# Patient Record
Sex: FEMALE | Race: WHITE | NOT HISPANIC OR LATINO | Employment: STUDENT | URBAN - METROPOLITAN AREA
[De-identification: names, ages, dates, MRNs, and addresses within clinical notes are randomized per-mention and may not be internally consistent; named-entity substitution may affect disease eponyms.]

---
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
## 2019-07-08 ENCOUNTER — OFFICE VISIT (OUTPATIENT)
Dept: NEUROLOGY | Facility: CLINIC | Age: 19
End: 2019-07-08
Payer: COMMERCIAL

## 2019-07-08 VITALS
HEART RATE: 73 BPM | HEIGHT: 67 IN | BODY MASS INDEX: 20.56 KG/M2 | WEIGHT: 131 LBS | SYSTOLIC BLOOD PRESSURE: 116 MMHG | DIASTOLIC BLOOD PRESSURE: 69 MMHG

## 2019-07-08 DIAGNOSIS — R20.2 PARESTHESIA: ICD-10-CM

## 2019-07-08 DIAGNOSIS — R20.0 NUMBNESS IN BOTH HANDS: ICD-10-CM

## 2019-07-08 DIAGNOSIS — I73.00 RAYNAUD'S DISEASE WITHOUT GANGRENE: Primary | ICD-10-CM

## 2019-07-08 PROCEDURE — 99205 OFFICE O/P NEW HI 60 MIN: CPT | Performed by: PSYCHIATRY & NEUROLOGY

## 2019-07-08 RX ORDER — DROSPIRENONE AND ETHINYL ESTRADIOL 0.02-3(28)
KIT ORAL
COMMUNITY
Start: 2019-05-31 | End: 2020-05-21 | Stop reason: SDUPTHER

## 2019-07-08 RX ORDER — LORAZEPAM 0.5 MG/1
TABLET ORAL
Qty: 2 TABLET | Refills: 0 | Status: SHIPPED | OUTPATIENT
Start: 2019-07-08 | End: 2019-07-17 | Stop reason: SDUPTHER

## 2019-07-08 NOTE — PROGRESS NOTES
Review of Systems   Constitutional: Positive for fatigue  HENT: Positive for tinnitus  Eyes: Positive for pain and visual disturbance  Respiratory: Positive for shortness of breath  Cardiovascular: Positive for chest pain and palpitations  Gastrointestinal: Positive for nausea  Endocrine: Negative  Genitourinary: Positive for urgency  Musculoskeletal: Positive for arthralgias, back pain, myalgias and neck pain  Skin: Negative  Allergic/Immunologic: Negative  Neurological: Positive for dizziness, syncope, light-headedness, numbness and headaches  Face numbness, tingling   Hematological: Negative  Psychiatric/Behavioral: Positive for confusion and sleep disturbance  The patient is nervous/anxious           Depression, mood swings

## 2019-07-08 NOTE — PROGRESS NOTES
[facility]LE [facility]  PATIENT:  Tim Wright  MRN:  [id number]  :  2000  DATE OF SERVICE:  2019    Assessment/Plan:     Problem List Items Addressed This Visit        Cardiovascular and Mediastinum    Raynaud's disease without gangrene - Primary       Other    Paresthesia    Relevant Medications    LORazepam (ATIVAN) 0 5 mg tablet    Numbness in both hands    Relevant Orders    MRI brain MS wo and w contrast    MRI cervical spine with and without contrast    CBC and differential    Comprehensive metabolic panel    TSH, 3rd generation with Free T4 reflex    JASON Screen w/ Reflex to Titer/Pattern    Sedimentation rate, automated    Vitamin B12         Ms Herbert Rowe has presented to [address] [place] [address] for evaluation of diffuse paresthesia involving upper and lower extremities  Lower extremities sensory dysfunction was evaluated in 2018 and she was noted sacralization in with L5-S1 foraminal stenosis and facet degeneration  We agreed that in circumstances of strong family history of autoimmune dysfunction, patient would require basic blood work and MRI brain and cervical spine to rule out CNS demyelination  Based on findings, patient would benefit from LP if clinically indicated  Patient also reported uncontrolled anxiety and panic attacks, follow up with psychiatrist highly advised at this point  Patient may reach her primary care team for further evaluation  Follow up with Najma will be scheduled based on imaging studies  We discussed headache frequency and intensity might be increased due to her estrogen component of BCP  Subjective: multifocal sensory dysfunction, panic attacks  HPI History of Present Illness     Mrs Herbert oRwe is a 19 yo who was referred to [address] [place] [address] for evaluation of tingling  Patient does not have established care neither [facility] nor [facility]  No records from recent provider- no referral in [facility]; Patient had PT for right hip pain in 2017  In 2015 patient had evaluation with blood work for celiac disease, TSH, HbA1C, ferritin and testosterone, CMV and EBV  Tingling in her legs, and now once side of the body, or at time different  sides  Patient had soccer related injury to lower back and hip, but concern for spine with mild facet degeneration at L5-S1  Patient stated she was difficulties walking, with no weakness, but numbness in arm bilaterally  Left calve heat sensation, with some disesthesia has been reported  Patient described electric zap in lower back  Grandmother has a a form of parkinsonism and lymphoma in grandfather,migraines and cystic lesion in brain in other family members  Lightheadedness, with blurriness and near-syncope was reported as well, and she was told her findings are due to worsening anemia       The following portions of the patient's history were reviewed and updated as appropriate:   She  has no past medical history on file  She   Patient Active Problem List    Diagnosis Date Noted    Raynaud's disease without gangrene 07/08/2019    Paresthesia 07/08/2019    Numbness in both hands 07/08/2019     She  has no past surgical history on file  Her family history includes Asthma in her father; Breast cancer in her mother; Migraines in her maternal grandmother; Prostate cancer in her father  She  reports that she has never smoked  She has never used smokeless tobacco  She reports that she does not drink alcohol or use drugs  Current Outpatient Medications   Medication Sig Dispense Refill    drospirenone-ethinyl estradiol (RAQUEL) 3-0 02 MG per tablet       LORazepam (ATIVAN) 0 5 mg tablet Take 1 tab 40 min prior to imaging with a second dose 10 min prior to mri  2 tablet 0     No current facility-administered medications for this visit        Current Outpatient Medications on File Prior to Visit   Medication Sig    drospirenone-ethinyl estradiol (RAQUEL) 3-0 02 MG per tablet      No current facility-administered medications on file prior to visit  She has No Known Allergies            Objective:    Blood pressure 116/69, pulse 73, height 5' 7" (1 702 m), weight 59 4 kg (131 lb)  Physical Exam/Neurological Exam    CONSTITUTIONAL: NAD, pleasant  NECK: supple, no lymphadenopathy, no thyromegaly, no JVD  CARDIOVASCULAR: RRR, normal S1S2, no murmurs, no rubs  RESP: clear to auscultation bilaterally, no wheezes/rhonchi/rales  ABDOMEN: soft, non tender, non distended  SKIN: no rash or skin lesions  EXTREMITIES: no edema, pulses 2+bilaterally  PSYCH: appropriate mood and affect  NEUROLOGIC COMPREHENSIVE EXAM: Patient is oriented to person, place and time, NAD; appropriate affect  CN II, III, IV, V, VI, VII,VIII,IX,X,XI-XII intact with EOMI, PERRLA, OKN intact, VF grossly intact, fundi poorly visualized secondary to pupillary constriction; symmetric face noted  Motor: 5/5 UE/LE bilateral symmetric; Sensory: perception of diminished light touch and pinprick hands bilaterally; normal vibration sensation feet bilaterally; Coordination within normal limits on FTN and DEE DEE testing; DTR: 2+/4 through, no Babinski, no clonus  Tandem gait is intact  Romberg: negative      ROS:  12 points of review of system was reviewed with the patient and was unremarkable with exception: see HPI  Review of Systems

## 2019-07-16 LAB
ALBUMIN SERPL-MCNC: 4.3 G/DL (ref 3.6–5.1)
ALBUMIN/GLOB SERPL: 1.7 (CALC) (ref 1–2.5)
ALP SERPL-CCNC: 35 U/L (ref 33–115)
ALT SERPL-CCNC: 10 U/L (ref 6–29)
ANA SER QL IF: NEGATIVE
AST SERPL-CCNC: 18 U/L (ref 10–30)
BASOPHILS # BLD AUTO: 42 CELLS/UL (ref 0–200)
BASOPHILS NFR BLD AUTO: 0.8 %
BILIRUB SERPL-MCNC: 0.6 MG/DL (ref 0.2–1.2)
BUN SERPL-MCNC: 9 MG/DL (ref 7–25)
BUN/CREAT SERPL: NORMAL (CALC) (ref 6–22)
CALCIUM SERPL-MCNC: 9.6 MG/DL (ref 8.6–10.2)
CHLORIDE SERPL-SCNC: 103 MMOL/L (ref 98–110)
CO2 SERPL-SCNC: 25 MMOL/L (ref 20–32)
CREAT SERPL-MCNC: 0.89 MG/DL (ref 0.5–1.1)
EOSINOPHIL # BLD AUTO: 88 CELLS/UL (ref 15–500)
EOSINOPHIL NFR BLD AUTO: 1.7 %
ERYTHROCYTE [DISTWIDTH] IN BLOOD BY AUTOMATED COUNT: 12.8 % (ref 11–15)
ERYTHROCYTE [SEDIMENTATION RATE] IN BLOOD BY WESTERGREN METHOD: 6 MM/H
GLOBULIN SER CALC-MCNC: 2.5 G/DL (CALC) (ref 1.9–3.7)
GLUCOSE SERPL-MCNC: 82 MG/DL (ref 65–99)
HCT VFR BLD AUTO: 39.9 % (ref 35–45)
HGB BLD-MCNC: 13.2 G/DL (ref 11.7–15.5)
LYMPHOCYTES # BLD AUTO: 2272 CELLS/UL (ref 850–3900)
LYMPHOCYTES NFR BLD AUTO: 43.7 %
MCH RBC QN AUTO: 28 PG (ref 27–33)
MCHC RBC AUTO-ENTMCNC: 33.1 G/DL (ref 32–36)
MCV RBC AUTO: 84.7 FL (ref 80–100)
MONOCYTES # BLD AUTO: 411 CELLS/UL (ref 200–950)
MONOCYTES NFR BLD AUTO: 7.9 %
NEUTROPHILS # BLD AUTO: 2387 CELLS/UL (ref 1500–7800)
NEUTROPHILS NFR BLD AUTO: 45.9 %
PLATELET # BLD AUTO: 247 THOUSAND/UL (ref 140–400)
PMV BLD REES-ECKER: 10 FL (ref 7.5–12.5)
POTASSIUM SERPL-SCNC: 4.4 MMOL/L (ref 3.5–5.3)
PROT SERPL-MCNC: 6.8 G/DL (ref 6.1–8.1)
RBC # BLD AUTO: 4.71 MILLION/UL (ref 3.8–5.1)
SL AMB EGFR AFRICAN AMERICAN: 108 ML/MIN/1.73M2
SL AMB EGFR NON AFRICAN AMERICAN: 93 ML/MIN/1.73M2
SODIUM SERPL-SCNC: 136 MMOL/L (ref 135–146)
TSH SERPL-ACNC: 1.52 MIU/L
VIT B12 SERPL-MCNC: 430 PG/ML (ref 200–1100)
WBC # BLD AUTO: 5.2 THOUSAND/UL (ref 3.8–10.8)

## 2019-07-17 DIAGNOSIS — R20.2 PARESTHESIA: ICD-10-CM

## 2019-07-17 NOTE — TELEPHONE ENCOUNTER
Patient's mother Mary Garza states the patient's MRIs were scheduled on 2 separate days (7/23 and 7/25)  She is requesting additional ativan be sent to [facility] for the second MRI  Mary Garza is also questioning if contrast for the MRIs is needed as she heard this can cause adverse effects  I did explain that it is recommended to have MRI done with contrast as this is how Dr Martin Townsend ordered the imaging and the patient's kidney function was normal  She would like to check with a provider  Made Mary Garza aware that lab results were normal      Mary Garza is also asking about out of pocket cost for MRIs, specifically if she will be charged twice for radiologists reading the images  I did explain that even if the MRIs were on the same day, it is two separate tests  She is requesting a call back with information  Called and Left a message on [employer] machine for a call back  Our office does not have this information  She can check the price using  on [facility] website or call number listed on  which is [phone] ([phone])

## 2019-07-19 RX ORDER — LORAZEPAM 0.5 MG/1
TABLET ORAL
Qty: 2 TABLET | Refills: 0 | Status: SHIPPED | OUTPATIENT
Start: 2019-07-19 | End: 2020-05-21

## 2019-07-19 NOTE — TELEPHONE ENCOUNTER
Patient's mother Apurva Hernandez was also questioning if contrast was needed  She is concerned about her daughter having a reaction  I did explain that it is recommended to have MRI done with contrast as this is how Dr Christina Moncada ordered the imaging and the patient's kidney function was normal  She would like to check with a provider  Please advise

## 2019-07-19 NOTE — TELEPHONE ENCOUNTER
Pt's mom made aware  She still is asking if she would be charged for 2 reading fees  I made her aware that she could contact central scheduling and they maybe able to advise or direct her further    She states that she has a phone number from [employer] for a financial dept, she will contact them

## 2019-07-19 NOTE — TELEPHONE ENCOUNTER
Left message for sheila to return call to office to inform her of message below as well as prescription for premed of ativan to be sent to pharmacy

## 2019-07-23 ENCOUNTER — HOSPITAL ENCOUNTER (OUTPATIENT)
Dept: MRI IMAGING | Facility: HOSPITAL | Age: 19
Discharge: HOME | End: 2019-07-23
Attending: PSYCHIATRY & NEUROLOGY
Payer: COMMERCIAL

## 2019-07-23 DIAGNOSIS — R20.0 NUMBNESS IN BOTH HANDS: ICD-10-CM

## 2019-07-23 PROCEDURE — A9585 GADOBUTROL INJECTION: HCPCS | Performed by: RADIOLOGY

## 2019-07-23 PROCEDURE — 70553 MRI BRAIN STEM W/O & W/DYE: CPT

## 2019-07-23 RX ADMIN — GADOBUTROL 6 ML: 604.72 INJECTION INTRAVENOUS at 15:19

## 2019-07-25 ENCOUNTER — HOSPITAL ENCOUNTER (OUTPATIENT)
Dept: MRI IMAGING | Facility: HOSPITAL | Age: 19
Discharge: HOME | End: 2019-07-25
Attending: PSYCHIATRY & NEUROLOGY
Payer: COMMERCIAL

## 2019-07-25 DIAGNOSIS — R20.0 NUMBNESS IN BOTH HANDS: ICD-10-CM

## 2019-07-25 PROCEDURE — 72156 MRI NECK SPINE W/O & W/DYE: CPT

## 2019-07-25 PROCEDURE — A9585 GADOBUTROL INJECTION: HCPCS | Performed by: RADIOLOGY

## 2019-07-25 RX ADMIN — GADOBUTROL 6 ML: 604.72 INJECTION INTRAVENOUS at 14:52

## 2019-07-31 ENCOUNTER — TELEPHONE (OUTPATIENT)
Dept: NEUROLOGY | Facility: CLINIC | Age: 19
End: 2019-07-31

## 2019-07-31 PROBLEM — H61.23 BILATERAL IMPACTED CERUMEN: Status: ACTIVE | Noted: 2019-07-31

## 2019-07-31 NOTE — TELEPHONE ENCOUNTER
Imaging were personally reviewed - no concern for MS or other CNS demyelination - her work up at [address] is completed  No LP advised either  Consult Note review was consistent with findings of uncontrolled anxiety and panic disorder - patent was advised to follow with psychiatry  Otherwise, follow up with primary care team advised, consider no MS noted during her evaluation

## 2019-07-31 NOTE — TELEPHONE ENCOUNTER
pt's mom called stating that pt had mri's and labs done  they viewed these on my chart  I reviewed these with her   she is asking what is the next step, does pt need to be seen for follow up and an other recommendations in regards to N/T, what is causing the N/T? She states that she returns to school on 8/7  Still gets numbness to her legs and face, occurs daily, numbness lasts for a couple min, but tingling can last all day  She states that she gets zaps that goes up her back  Gets when she is walking long distances  Usually occurs daily but has not occurred for the last 4 days  also complains that her chest hurts, like someone is sitting on her chest   Not sure if related to anxiety  This is all the time, gets bad at night or if she walks up the stairs  She also states that when she is on the treadmill, she has chest pain and wheezes  She was previously given an inhaler for questionable exercise induced asthma  Feels like she has always had this but has been getting worse  I did advise that if pt is having chest pain that she should be evaluated in the ER  She is asking if someone could just see her  I made her aware that she should at least call pcp in regards to this    Please advise  [phone]- ext [place]-hal[employer] work  [phone]  Try work phone first

## 2019-08-02 NOTE — TELEPHONE ENCOUNTER
Pt's mother Yoanna Talley (on communication consent) called and advised of all of the below  She verbalized clear understanding of all instructions  Asking if there are other test you recommend bec pt is reporting headache frequency and intensity  Phylicia Newsome that per office notes 7/8/19-it was discussed that headache frequency and intensity might be increased due to her estrogen component of BCP  Yoanna Talley states that pt did talk to her OB and pt was advised to change her BCP to progesterone only but pt declined at this time  Yoanna Talley wants to know if pt should be concern of the tiny, right paramedian protrusion C6-C7? Any recommendation? Also, states that pt gets chest pain  Phylicia Newsome if pt experiencing chest pain, pt should go to the ER to get vannessaal  Yoanna Talley verbalized understanding

## 2019-08-02 NOTE — TELEPHONE ENCOUNTER
Preeti - Greatly agree with your advise and recommendations, no additional recommendations from neurology prospective, including minimal degenerative changes at her cervical region  Thank you!

## 2019-12-13 NOTE — PROGRESS NOTES
Cardiology Consultation     Hieu Valentin  [id number]  2000  [facility] [address] [address]      1  Atypical chest pain  POCT ECG    Stress test only, exercise   2  Palpitations  Echo complete with contrast if indicated    AMB extended holter monitor       Discussion/Summary:  Ms Marilou Marrero is a pleasant 39-year-old female who presents to the office today for the evaluation of chest pain and palpitations  Her chest pain is atypical in nature  It occurs at rest   It sounds more musculoskeletal in nature  Nonetheless due to her concern over her symptoms I have recommended that she undergo a exercise stress test   She also has palpitations with a sudden onset and offset  These occur a few times a week  I have asked that she undergo an echocardiogram to rule out any underlying structural heart disease  I have also asked that she undergo a one-week event monitor to look for symptom-rhythm correlation to rule out SVT as a cause for her symptoms  I will relay the above-noted testing to her over the phone and of course if any of these tests are abnormal she will return to the office for further evaluation  History of Present Illness:  Ms Marilou Marrero is a pleasant 39-year-old female who presents to the office today for the evaluation of multiple symptoms  She states over the past year she has been experiencing chest discomfort  She describes a very sharp, stabbing central chest discomfort which occurs randomly, mostly at night and at rest   At times she feels some chest discomfort with deep breathing but not consistently  She denies any positional component to her chest pain  She also states if she moves a certain way or twists her torso suddenly she will developed the discomfort  It is not related to food    It can last for a few minutes and resolves spontaneously without any specific intervention  In the summer she was active exercising at the gym on a regular basis  She was utilizing a treadmill and running on flat ground for about 20 minutes  With this she would experience some shortness of breath which is chronic  She would not experience any of the sharp stabbing chest discomfort but would note tightness which is a chronic issue which improves with the utilization of her inhaler for her exercise-induced asthma  She also has been experiencing palpitations  A few times a week she will experience which she describes as a sudden onset of a rapid heartbeat  She feels as if her heart stops and then again beats rapidly  This can occur intermittently for a few minutes  With this she denies any other symptoms  Otherwise she will note some lightheadedness if she stands too fast or walks up a staircase  This is a chronic issue  She denies any side wendi syncope  She attempts to remain well hydrated during the day  She reports drinking 32 oz of water on a regular basis  She denies excessive caffeine intake or use of over the counter supplements       Patient Active Problem List   Diagnosis    Raynaud's disease without gangrene    Paresthesia    Numbness in both hands    Bilateral impacted cerumen    Atypical chest pain    Palpitations     Past Medical History:   Diagnosis Date    Acne     Asthma      Social History     Socioeconomic History    Marital status: Single     Spouse name: Not on file    Number of children: Not on file    Years of education: Not on file    Highest education level: Not on file   Occupational History    Not on file   Social Needs    Financial resource strain: Not on file    Food insecurity:     Worry: Not on file     Inability: Not on file    Transportation needs:     Medical: Not on file     Non-medical: Not on file   Tobacco Use    Smoking status: Never Smoker    Smokeless tobacco: Never Used   Substance and Sexual Activity    Alcohol use: Never     Frequency: Never    Drug use: Never    Sexual activity: Yes   Lifestyle    Physical activity:     Days per week: Not on file     Minutes per session: Not on file    Stress: Not on file   Relationships    Social connections:     Talks on phone: Not on file     Gets together: Not on file     Attends Restorationism service: Not on file     Active member of club or organization: Not on file     Attends meetings of clubs or organizations: Not on file     Relationship status: Not on file    Intimate partner violence:     Fear of current or ex partner: Not on file     Emotionally abused: Not on file     Physically abused: Not on file     Forced sexual activity: Not on file   Other Topics Concern    Not on file   Social History Narrative    Not on file      Family History   Problem Relation Age of Onset    Breast cancer Mother     Migraines Maternal Grandmother     Prostate cancer Father     Asthma Father      History reviewed  No pertinent surgical history  Current Outpatient Medications:     drospirenone-ethinyl estradiol (RAQUEL) 3-0 02 MG per tablet, , Disp: , Rfl:     LORazepam (ATIVAN) 0 5 mg tablet, Take 1 tab 40 min prior to imaging with a second dose 10 min prior to mri , Disp: 2 tablet, Rfl: 0  Allergies   Allergen Reactions    Raquel [Drospirenone-Ethinyl Estradiol]          Labs:  No visits with results within 2 Month(s) from this visit     Latest known visit with results is:   Orders Only on 07/12/2019   Component Date Value    Glucose, Random 07/12/2019 82     BUN 07/12/2019 9     Creatinine 07/12/2019 0 89     eGFR Non African American 07/12/2019 93     eGFR  07/12/2019 108     SL AMB BUN/CREATININE RA* 81/42/8307 NOT APPLICABLE     Sodium 36/35/4758 136     Potassium 07/12/2019 4 4     Chloride 07/12/2019 103     CO2 07/12/2019 25     SL AMB CALCIUM 07/12/2019 9 6     Protein, Total 07/12/2019 6 8     Albumin 07/12/2019 4 3     Globulin 07/12/2019 2 5     Albumin/Globulin Ratio 07/12/2019 1 7     TOTAL BILIRUBIN 07/12/2019 0 6     Alkaline Phosphatase 07/12/2019 35     AST 07/12/2019 18     ALT 07/12/2019 10     Sedimentation Rate 07/12/2019 6     White Blood Cell Count 07/12/2019 5 2     Red Blood Cell Count 07/12/2019 4 71     Hemoglobin 07/12/2019 13 2     HCT 07/12/2019 39 9     MCV 07/12/2019 84 7     MCH 07/12/2019 28 0     MCHC 07/12/2019 33 1     RDW 07/12/2019 12 8     Platelet Count 74/05/2388 247     SL AMB MPV 07/12/2019 10 0     Neutrophils (Absolute) 07/12/2019 2,387     Lymphocytes (Absolute) 07/12/2019 2,272     Monocytes (Absolute) 07/12/2019 411     Eosinophils (Absolute) 07/12/2019 88     Basophils ABS 07/12/2019 42     Neutrophils 07/12/2019 45 9     Lymphocytes 07/12/2019 43 7     Monocytes 07/12/2019 7 9     Eosinophils 07/12/2019 1 7     Basophils PCT 07/12/2019 0 8     JASON Screen, IFA 07/12/2019 NEGATIVE     Vitamin B-12 07/12/2019 430     TSH W/RFX TO FREE T4 07/12/2019 1 52         Imaging: No results found  Review of Systems:  Review of Systems   Respiratory: Positive for chest tightness and shortness of breath  Cardiovascular: Positive for chest pain and palpitations  All other systems reviewed and are negative          Vitals:    12/16/19 0859   BP: 100/70   BP Location: Right arm   Patient Position: Sitting   Cuff Size: Standard   Pulse: 91   Weight: 59 kg (130 lb)   Height: 5' 7" (1 702 m)     Vitals:    12/16/19 0859   Weight: 59 kg (130 lb)     Height: 5' 7" (170 2 cm)     Physical Exam:  General appearance:  Appears stated age, alert, well appearing and in no distress  HEENT:  PERRLA, EOMI, no scleral icterus, no conjunctival pallor  NECK:  Supple, No elevated JVP, no thyromegaly, no carotid bruits  HEART:  Regular rate and rhythm, normal S1/S2, no S3/S4, no murmur or rub  LUNGS:  Clear to auscultation bilaterally  ABDOMEN:  Soft, non-tender, positive bowel sounds, no rebound or guarding, no organomegaly   EXTREMITIES:  No edema  VASCULAR:  Normal pedal pulses   SKIN: No lesions or rashes on exposed skin  NEURO:  CN II-XII intact, no focal deficits

## 2019-12-16 ENCOUNTER — OFFICE VISIT (OUTPATIENT)
Dept: CARDIOLOGY CLINIC | Facility: CLINIC | Age: 19
End: 2019-12-16
Payer: COMMERCIAL

## 2019-12-16 ENCOUNTER — TELEPHONE (OUTPATIENT)
Dept: CARDIOLOGY CLINIC | Facility: CLINIC | Age: 19
End: 2019-12-16

## 2019-12-16 VITALS
DIASTOLIC BLOOD PRESSURE: 70 MMHG | HEIGHT: 67 IN | HEART RATE: 91 BPM | SYSTOLIC BLOOD PRESSURE: 100 MMHG | BODY MASS INDEX: 20.4 KG/M2 | WEIGHT: 130 LBS

## 2019-12-16 DIAGNOSIS — R07.89 ATYPICAL CHEST PAIN: Primary | ICD-10-CM

## 2019-12-16 DIAGNOSIS — R00.2 PALPITATIONS: ICD-10-CM

## 2019-12-16 PROCEDURE — 93000 ELECTROCARDIOGRAM COMPLETE: CPT | Performed by: INTERNAL MEDICINE

## 2019-12-16 PROCEDURE — 99244 OFF/OP CNSLTJ NEW/EST MOD 40: CPT | Performed by: INTERNAL MEDICINE

## 2019-12-19 NOTE — TELEPHONE ENCOUNTER
She does not need auth for her Zio Patch per Kelby SABA at [facility]  12/18/19

## 2019-12-20 ENCOUNTER — HOSPITAL ENCOUNTER (OUTPATIENT)
Dept: NON INVASIVE DIAGNOSTICS | Facility: CLINIC | Age: 19
Discharge: HOME | End: 2019-12-20
Payer: COMMERCIAL

## 2019-12-20 DIAGNOSIS — R07.89 ATYPICAL CHEST PAIN: ICD-10-CM

## 2019-12-20 LAB
ARRHY DURING EX: NORMAL
CHEST PAIN STATEMENT: NORMAL
MAX DIASTOLIC BP: 78 MMHG
MAX HEART RATE: 179 BPM
MAX PREDICTED HEART RATE: 200 BPM
MAX. SYSTOLIC BP: 130 MMHG
PROTOCOL NAME: NORMAL
REASON FOR TERMINATION: NORMAL
TARGET HR FORMULA: NORMAL
TEST INDICATION: NORMAL
TIME IN EXERCISE PHASE: NORMAL

## 2019-12-20 PROCEDURE — 93018 CV STRESS TEST I&R ONLY: CPT | Performed by: INTERNAL MEDICINE

## 2019-12-20 PROCEDURE — 93017 CV STRESS TEST TRACING ONLY: CPT

## 2019-12-20 PROCEDURE — 93016 CV STRESS TEST SUPVJ ONLY: CPT | Performed by: INTERNAL MEDICINE

## 2019-12-26 ENCOUNTER — TRANSCRIBE ORDERS (OUTPATIENT)
Dept: ADMINISTRATIVE | Facility: HOSPITAL | Age: 19
End: 2019-12-26

## 2019-12-26 ENCOUNTER — HOSPITAL ENCOUNTER (OUTPATIENT)
Dept: NON INVASIVE DIAGNOSTICS | Facility: HOSPITAL | Age: 19
Discharge: HOME | End: 2019-12-26
Payer: COMMERCIAL

## 2019-12-26 DIAGNOSIS — R00.2 PALPITATIONS: ICD-10-CM

## 2019-12-26 PROCEDURE — 93306 TTE W/DOPPLER COMPLETE: CPT

## 2019-12-27 PROCEDURE — 93306 TTE W/DOPPLER COMPLETE: CPT | Performed by: INTERNAL MEDICINE

## 2020-01-24 ENCOUNTER — CLINICAL SUPPORT (OUTPATIENT)
Dept: CARDIOLOGY CLINIC | Facility: CLINIC | Age: 20
End: 2020-01-24
Payer: COMMERCIAL

## 2020-01-24 DIAGNOSIS — R00.2 PALPITATIONS: ICD-10-CM

## 2020-01-24 PROCEDURE — 0298T PR EXT ECG > 48HR TO 21 DAY REVIEW AND INTERPRETATN: CPT | Performed by: INTERNAL MEDICINE

## 2020-02-10 ENCOUNTER — TELEPHONE (OUTPATIENT)
Dept: CARDIOLOGY CLINIC | Facility: CLINIC | Age: 20
End: 2020-02-10

## 2020-02-10 NOTE — TELEPHONE ENCOUNTER
A message was left on an answering machine last week - symptoms did not correlate with an arrhythmia  Please relay again - thanks       Lourdes Del Castillo

## 2020-05-21 ENCOUNTER — TELEMEDICINE (OUTPATIENT)
Dept: FAMILY MEDICINE CLINIC | Facility: CLINIC | Age: 20
End: 2020-05-21
Payer: COMMERCIAL

## 2020-05-21 DIAGNOSIS — L70.0 ACNE VULGARIS: Primary | ICD-10-CM

## 2020-05-21 DIAGNOSIS — J45.990 EXERCISE-INDUCED ASTHMA: ICD-10-CM

## 2020-05-21 DIAGNOSIS — N92.6 IRREGULAR MENSES: ICD-10-CM

## 2020-05-21 DIAGNOSIS — R07.89 ATYPICAL CHEST PAIN: ICD-10-CM

## 2020-05-21 DIAGNOSIS — G43.109 MIGRAINE WITH AURA AND WITHOUT STATUS MIGRAINOSUS, NOT INTRACTABLE: ICD-10-CM

## 2020-05-21 DIAGNOSIS — I73.00 RAYNAUD'S DISEASE WITHOUT GANGRENE: ICD-10-CM

## 2020-05-21 DIAGNOSIS — R00.2 PALPITATIONS: ICD-10-CM

## 2020-05-21 PROBLEM — L70.9 ACNE: Status: ACTIVE | Noted: 2017-03-24

## 2020-05-21 PROBLEM — H61.23 BILATERAL IMPACTED CERUMEN: Status: RESOLVED | Noted: 2019-07-31 | Resolved: 2020-05-21

## 2020-05-21 PROCEDURE — 99203 OFFICE O/P NEW LOW 30 MIN: CPT | Performed by: NURSE PRACTITIONER

## 2020-05-21 RX ORDER — TERBINAFINE HYDROCHLORIDE 250 MG/1
TABLET ORAL
COMMUNITY
Start: 2020-02-24 | End: 2020-05-21

## 2020-05-21 RX ORDER — DROSPIRENONE AND ETHINYL ESTRADIOL 0.02-3(28)
1 KIT ORAL DAILY
Qty: 28 TABLET | Refills: 0 | Status: SHIPPED | OUTPATIENT
Start: 2020-05-21 | End: 2020-05-21 | Stop reason: SDUPTHER

## 2020-05-21 RX ORDER — DROSPIRENONE AND ETHINYL ESTRADIOL 0.02-3(28)
1 KIT ORAL DAILY
Qty: 84 TABLET | Refills: 0 | Status: SHIPPED | OUTPATIENT
Start: 2020-05-21 | End: 2020-08-04 | Stop reason: SDUPTHER

## 2020-08-04 DIAGNOSIS — N92.6 IRREGULAR MENSES: ICD-10-CM

## 2020-08-04 DIAGNOSIS — L70.0 ACNE VULGARIS: ICD-10-CM

## 2020-08-04 RX ORDER — DROSPIRENONE AND ETHINYL ESTRADIOL 0.02-3(28)
1 KIT ORAL DAILY
Qty: 84 TABLET | Refills: 1 | Status: SHIPPED | OUTPATIENT
Start: 2020-08-04 | End: 2020-11-24 | Stop reason: SDUPTHER

## 2020-10-12 ENCOUNTER — OFFICE VISIT (OUTPATIENT)
Dept: FAMILY MEDICINE CLINIC | Facility: CLINIC | Age: 20
End: 2020-10-12
Payer: COMMERCIAL

## 2020-10-12 ENCOUNTER — TRANSCRIBE ORDERS (OUTPATIENT)
Dept: ADMINISTRATIVE | Age: 20
End: 2020-10-12

## 2020-10-12 ENCOUNTER — APPOINTMENT (OUTPATIENT)
Dept: RADIOLOGY | Age: 20
End: 2020-10-12
Payer: COMMERCIAL

## 2020-10-12 VITALS — HEART RATE: 88 BPM | OXYGEN SATURATION: 99 %

## 2020-10-12 DIAGNOSIS — Z20.828 EXPOSURE TO SARS-ASSOCIATED CORONAVIRUS: Primary | ICD-10-CM

## 2020-10-12 DIAGNOSIS — R07.1 CHEST PAIN ON BREATHING: ICD-10-CM

## 2020-10-12 DIAGNOSIS — M25.579 ARTHRALGIA OF FOOT, UNSPECIFIED LATERALITY: ICD-10-CM

## 2020-10-12 DIAGNOSIS — Z20.828 EXPOSURE TO MONONUCLEOSIS SYNDROME: ICD-10-CM

## 2020-10-12 DIAGNOSIS — G43.109 MIGRAINE WITH AURA AND WITHOUT STATUS MIGRAINOSUS, NOT INTRACTABLE: ICD-10-CM

## 2020-10-12 DIAGNOSIS — R06.02 SHORTNESS OF BREATH: ICD-10-CM

## 2020-10-12 PROCEDURE — 99214 OFFICE O/P EST MOD 30 MIN: CPT | Performed by: NURSE PRACTITIONER

## 2020-10-12 PROCEDURE — 71046 X-RAY EXAM CHEST 2 VIEWS: CPT

## 2020-10-12 PROCEDURE — 1036F TOBACCO NON-USER: CPT | Performed by: NURSE PRACTITIONER

## 2020-10-14 LAB
25(OH)D3 SERPL-MCNC: 39 NG/ML (ref 30–100)
ALBUMIN SERPL-MCNC: 4.4 G/DL (ref 3.6–5.1)
ALBUMIN/GLOB SERPL: 1.8 (CALC) (ref 1–2.5)
ALP SERPL-CCNC: 35 U/L (ref 31–125)
ALT SERPL-CCNC: 8 U/L (ref 6–29)
ANA SER QL IF: NEGATIVE
AST SERPL-CCNC: 12 U/L (ref 10–30)
BASOPHILS # BLD AUTO: 19 CELLS/UL (ref 0–200)
BASOPHILS NFR BLD AUTO: 0.3 %
BILIRUB SERPL-MCNC: 0.4 MG/DL (ref 0.2–1.2)
BUN SERPL-MCNC: 12 MG/DL (ref 7–25)
BUN/CREAT SERPL: NORMAL (CALC) (ref 6–22)
CALCIUM SERPL-MCNC: 9.9 MG/DL (ref 8.6–10.2)
CHLORIDE SERPL-SCNC: 102 MMOL/L (ref 98–110)
CO2 SERPL-SCNC: 27 MMOL/L (ref 20–32)
CREAT SERPL-MCNC: 0.81 MG/DL (ref 0.5–1.1)
D DIMER PPP FEU-MCNC: 0.37 MCG/ML FEU
EBV NA IGG SER IA-ACNC: 338 U/ML
EBV VCA IGG SER IA-ACNC: 178 U/ML
EBV VCA IGM SER IA-ACNC: <36 U/ML
EOSINOPHIL # BLD AUTO: 50 CELLS/UL (ref 15–500)
EOSINOPHIL NFR BLD AUTO: 0.8 %
ERYTHROCYTE [DISTWIDTH] IN BLOOD BY AUTOMATED COUNT: 12.6 % (ref 11–15)
GLOBULIN SER CALC-MCNC: 2.5 G/DL (CALC) (ref 1.9–3.7)
GLUCOSE SERPL-MCNC: 88 MG/DL (ref 65–139)
HCT VFR BLD AUTO: 38.6 % (ref 35–45)
HGB BLD-MCNC: 12.9 G/DL (ref 11.7–15.5)
LYMPHOCYTES # BLD AUTO: 1890 CELLS/UL (ref 850–3900)
LYMPHOCYTES NFR BLD AUTO: 30 %
MCH RBC QN AUTO: 28.7 PG (ref 27–33)
MCHC RBC AUTO-ENTMCNC: 33.4 G/DL (ref 32–36)
MCV RBC AUTO: 85.8 FL (ref 80–100)
MONOCYTES # BLD AUTO: 378 CELLS/UL (ref 200–950)
MONOCYTES NFR BLD AUTO: 6 %
NEUTROPHILS # BLD AUTO: 3963 CELLS/UL (ref 1500–7800)
NEUTROPHILS NFR BLD AUTO: 62.9 %
PLATELET # BLD AUTO: 255 THOUSAND/UL (ref 140–400)
PMV BLD REES-ECKER: 10 FL (ref 7.5–12.5)
POTASSIUM SERPL-SCNC: 4.4 MMOL/L (ref 3.5–5.3)
PROT SERPL-MCNC: 6.9 G/DL (ref 6.1–8.1)
RBC # BLD AUTO: 4.5 MILLION/UL (ref 3.8–5.1)
SARS-COV-2 IGG SERPL QL IA: POSITIVE
SL AMB EGFR AFRICAN AMERICAN: 120 ML/MIN/1.73M2
SL AMB EGFR NON AFRICAN AMERICAN: 104 ML/MIN/1.73M2
SODIUM SERPL-SCNC: 137 MMOL/L (ref 135–146)
TSH SERPL-ACNC: 2.68 MIU/L
URATE SERPL-MCNC: 6 MG/DL (ref 2.5–7)
WBC # BLD AUTO: 6.3 THOUSAND/UL (ref 3.8–10.8)

## 2020-11-02 ENCOUNTER — TELEPHONE (OUTPATIENT)
Dept: FAMILY MEDICINE CLINIC | Facility: CLINIC | Age: 20
End: 2020-11-02

## 2020-11-24 ENCOUNTER — TELEMEDICINE (OUTPATIENT)
Dept: FAMILY MEDICINE CLINIC | Facility: CLINIC | Age: 20
End: 2020-11-24
Payer: COMMERCIAL

## 2020-11-24 DIAGNOSIS — N92.6 IRREGULAR MENSES: Primary | ICD-10-CM

## 2020-11-24 DIAGNOSIS — L70.0 ACNE VULGARIS: ICD-10-CM

## 2020-11-24 DIAGNOSIS — G43.109 MIGRAINE WITH AURA AND WITHOUT STATUS MIGRAINOSUS, NOT INTRACTABLE: ICD-10-CM

## 2020-11-24 DIAGNOSIS — B37.3 VULVOVAGINAL CANDIDIASIS: ICD-10-CM

## 2020-11-24 DIAGNOSIS — R07.89 ATYPICAL CHEST PAIN: ICD-10-CM

## 2020-11-24 DIAGNOSIS — J45.990 EXERCISE-INDUCED ASTHMA: ICD-10-CM

## 2020-11-24 PROCEDURE — 99213 OFFICE O/P EST LOW 20 MIN: CPT | Performed by: NURSE PRACTITIONER

## 2020-11-24 RX ORDER — TRETINOIN 0.05 G/100G
GEL TOPICAL
COMMUNITY
Start: 2020-11-05

## 2020-11-24 RX ORDER — CHLORHEXIDINE GLUCONATE 0.12 MG/ML
RINSE ORAL
COMMUNITY
Start: 2020-10-09 | End: 2022-07-26

## 2020-11-24 RX ORDER — DROSPIRENONE AND ETHINYL ESTRADIOL 0.02-3(28)
1 KIT ORAL DAILY
Qty: 84 TABLET | Refills: 1 | Status: SHIPPED | OUTPATIENT
Start: 2020-11-24 | End: 2021-06-02

## 2020-12-22 ENCOUNTER — ANNUAL EXAM (OUTPATIENT)
Dept: FAMILY MEDICINE CLINIC | Facility: CLINIC | Age: 20
End: 2020-12-22
Payer: COMMERCIAL

## 2020-12-22 VITALS
HEIGHT: 68 IN | DIASTOLIC BLOOD PRESSURE: 68 MMHG | SYSTOLIC BLOOD PRESSURE: 108 MMHG | BODY MASS INDEX: 20.13 KG/M2 | HEART RATE: 106 BPM | RESPIRATION RATE: 16 BRPM | TEMPERATURE: 97.3 F | WEIGHT: 132.8 LBS | OXYGEN SATURATION: 98 %

## 2020-12-22 DIAGNOSIS — R82.90 BAD ODOR OF URINE: ICD-10-CM

## 2020-12-22 DIAGNOSIS — Z11.3 SCREENING EXAMINATION FOR STD (SEXUALLY TRANSMITTED DISEASE): ICD-10-CM

## 2020-12-22 DIAGNOSIS — Z12.4 CERVICAL CANCER SCREENING: Primary | ICD-10-CM

## 2020-12-22 DIAGNOSIS — F41.9 ANXIETY: ICD-10-CM

## 2020-12-22 DIAGNOSIS — N63.20 LEFT BREAST LUMP: ICD-10-CM

## 2020-12-22 LAB
SL AMB  POCT GLUCOSE, UA: ABNORMAL
SL AMB LEUKOCYTE ESTERASE,UA: POSITIVE
SL AMB POCT BILIRUBIN,UA: ABNORMAL
SL AMB POCT BLOOD,UA: ABNORMAL
SL AMB POCT CLARITY,UA: ABNORMAL
SL AMB POCT COLOR,UA: YELLOW
SL AMB POCT KETONES,UA: POSITIVE
SL AMB POCT NITRITE,UA: ABNORMAL
SL AMB POCT PH,UA: 6.5
SL AMB POCT SPECIFIC GRAVITY,UA: 1.01
SL AMB POCT URINE PROTEIN: POSITIVE
SL AMB POCT UROBILINOGEN: 0.2

## 2020-12-22 PROCEDURE — 81003 URINALYSIS AUTO W/O SCOPE: CPT | Performed by: NURSE PRACTITIONER

## 2020-12-22 PROCEDURE — G0145 SCR C/V CYTO,THINLAYER,RESCR: HCPCS | Performed by: NURSE PRACTITIONER

## 2020-12-22 PROCEDURE — 87491 CHLMYD TRACH DNA AMP PROBE: CPT | Performed by: NURSE PRACTITIONER

## 2020-12-22 PROCEDURE — 87591 N.GONORRHOEAE DNA AMP PROB: CPT | Performed by: NURSE PRACTITIONER

## 2020-12-22 PROCEDURE — 1036F TOBACCO NON-USER: CPT | Performed by: NURSE PRACTITIONER

## 2020-12-22 PROCEDURE — 99214 OFFICE O/P EST MOD 30 MIN: CPT | Performed by: NURSE PRACTITIONER

## 2020-12-22 PROCEDURE — 87086 URINE CULTURE/COLONY COUNT: CPT | Performed by: NURSE PRACTITIONER

## 2020-12-22 PROCEDURE — 3008F BODY MASS INDEX DOCD: CPT | Performed by: NURSE PRACTITIONER

## 2020-12-22 PROCEDURE — 87070 CULTURE OTHR SPECIMN AEROBIC: CPT | Performed by: NURSE PRACTITIONER

## 2020-12-22 PROCEDURE — S0612 ANNUAL GYNECOLOGICAL EXAMINA: HCPCS | Performed by: NURSE PRACTITIONER

## 2020-12-22 RX ORDER — DOXYCYCLINE HYCLATE 20 MG
TABLET ORAL
COMMUNITY
Start: 2020-12-02 | End: 2022-07-26

## 2020-12-22 RX ORDER — MELOXICAM 15 MG/1
TABLET ORAL
COMMUNITY
Start: 2020-12-01 | End: 2022-07-26

## 2020-12-22 RX ORDER — HYDROXYZINE HYDROCHLORIDE 25 MG/1
25 TABLET, FILM COATED ORAL 2 TIMES DAILY PRN
Qty: 30 TABLET | Refills: 0 | Status: SHIPPED | OUTPATIENT
Start: 2020-12-22 | End: 2022-07-26

## 2020-12-23 LAB
BACTERIA UR CULT: NORMAL
C TRACH DNA SPEC QL NAA+PROBE: NEGATIVE
N GONORRHOEA DNA SPEC QL NAA+PROBE: NEGATIVE

## 2020-12-25 LAB — BACTERIA GENITAL AEROBE CULT: NORMAL

## 2020-12-28 LAB
LAB AP GYN PRIMARY INTERPRETATION: NORMAL
LAB AP LMP: NORMAL
Lab: NORMAL

## 2020-12-29 ENCOUNTER — HOSPITAL ENCOUNTER (OUTPATIENT)
Dept: ULTRASOUND IMAGING | Facility: CLINIC | Age: 20
Discharge: HOME | End: 2020-12-29
Payer: COMMERCIAL

## 2020-12-29 VITALS — WEIGHT: 130 LBS | HEIGHT: 67 IN | BODY MASS INDEX: 20.4 KG/M2

## 2020-12-29 DIAGNOSIS — N63.20 LEFT BREAST LUMP: ICD-10-CM

## 2020-12-29 PROCEDURE — 76642 ULTRASOUND BREAST LIMITED: CPT

## 2021-06-02 DIAGNOSIS — N92.6 IRREGULAR MENSES: ICD-10-CM

## 2021-06-02 DIAGNOSIS — L70.0 ACNE VULGARIS: ICD-10-CM

## 2021-06-02 RX ORDER — DROSPIRENONE AND ETHINYL ESTRADIOL 0.02-3(28)
KIT ORAL
Qty: 84 TABLET | Refills: 3 | Status: SHIPPED | OUTPATIENT
Start: 2021-06-02 | End: 2022-05-04

## 2021-12-15 ENCOUNTER — 1 YEAR COMPLETE EXAM (OUTPATIENT)
Dept: URBAN - METROPOLITAN AREA CLINIC 6 | Facility: CLINIC | Age: 21
End: 2021-12-15

## 2021-12-15 DIAGNOSIS — Z01.00: ICD-10-CM

## 2021-12-15 PROCEDURE — 92014 COMPRE OPH EXAM EST PT 1/>: CPT

## 2021-12-15 PROCEDURE — G8428 CUR MEDS NOT DOCUMENT: HCPCS

## 2021-12-15 PROCEDURE — 1036F TOBACCO NON-USER: CPT

## 2021-12-15 ASSESSMENT — TONOMETRY
OD_IOP_MMHG: 16
OS_IOP_MMHG: 14

## 2021-12-15 ASSESSMENT — VISUAL ACUITY
OS_SC: 20/20
OD_SC: 20/20

## 2022-07-13 ENCOUNTER — RA CDI HCC (OUTPATIENT)
Dept: OTHER | Facility: HOSPITAL | Age: 22
End: 2022-07-13

## 2022-07-13 NOTE — PROGRESS NOTES
[facility]  coding opportunities       Chart reviewed, no opportunity found: CHART REVIEWED, NO OPPORTUNITY FOUND        Patients Insurance        Commercial Insurance: [address] [place] [address]

## 2022-07-22 RX ORDER — TAZAROTENE 0.05 MG/G
CREAM CUTANEOUS
COMMUNITY
Start: 2022-04-15

## 2022-07-22 RX ORDER — SPIRONOLACTONE 50 MG/1
TABLET, FILM COATED ORAL
COMMUNITY
Start: 2022-06-29 | End: 2022-07-26

## 2022-07-26 ENCOUNTER — OFFICE VISIT (OUTPATIENT)
Dept: FAMILY MEDICINE CLINIC | Facility: CLINIC | Age: 22
End: 2022-07-26
Payer: COMMERCIAL

## 2022-07-26 VITALS
SYSTOLIC BLOOD PRESSURE: 112 MMHG | TEMPERATURE: 97 F | WEIGHT: 135 LBS | BODY MASS INDEX: 21.19 KG/M2 | HEART RATE: 80 BPM | DIASTOLIC BLOOD PRESSURE: 78 MMHG | RESPIRATION RATE: 16 BRPM | OXYGEN SATURATION: 99 % | HEIGHT: 67 IN

## 2022-07-26 DIAGNOSIS — R42 DIZZINESS: ICD-10-CM

## 2022-07-26 DIAGNOSIS — Z23 ENCOUNTER FOR IMMUNIZATION: ICD-10-CM

## 2022-07-26 DIAGNOSIS — G89.29 CHRONIC BILATERAL LOW BACK PAIN WITH BILATERAL SCIATICA: ICD-10-CM

## 2022-07-26 DIAGNOSIS — M54.41 CHRONIC BILATERAL LOW BACK PAIN WITH BILATERAL SCIATICA: ICD-10-CM

## 2022-07-26 DIAGNOSIS — Z11.59 NEED FOR HEPATITIS C SCREENING TEST: ICD-10-CM

## 2022-07-26 DIAGNOSIS — Z11.4 SCREENING FOR HIV (HUMAN IMMUNODEFICIENCY VIRUS): ICD-10-CM

## 2022-07-26 DIAGNOSIS — F41.9 ANXIETY: ICD-10-CM

## 2022-07-26 DIAGNOSIS — M54.42 CHRONIC BILATERAL LOW BACK PAIN WITH BILATERAL SCIATICA: ICD-10-CM

## 2022-07-26 DIAGNOSIS — J45.990 EXERCISE-INDUCED ASTHMA: ICD-10-CM

## 2022-07-26 DIAGNOSIS — Z00.00 ANNUAL PHYSICAL EXAM: Primary | ICD-10-CM

## 2022-07-26 DIAGNOSIS — F51.04 PSYCHOPHYSIOLOGICAL INSOMNIA: ICD-10-CM

## 2022-07-26 DIAGNOSIS — F51.4 SLEEP TERROR: ICD-10-CM

## 2022-07-26 DIAGNOSIS — M79.672 LEFT FOOT PAIN: ICD-10-CM

## 2022-07-26 PROBLEM — R20.0 NUMBNESS IN BOTH HANDS: Status: RESOLVED | Noted: 2019-07-08 | Resolved: 2022-07-26

## 2022-07-26 PROBLEM — G43.109 MIGRAINE WITH AURA AND WITHOUT STATUS MIGRAINOSUS, NOT INTRACTABLE: Status: RESOLVED | Noted: 2019-08-01 | Resolved: 2022-07-26

## 2022-07-26 PROCEDURE — 3725F SCREEN DEPRESSION PERFORMED: CPT | Performed by: NURSE PRACTITIONER

## 2022-07-26 PROCEDURE — 99395 PREV VISIT EST AGE 18-39: CPT | Performed by: NURSE PRACTITIONER

## 2022-07-26 RX ORDER — HYDROXYZINE HYDROCHLORIDE 25 MG/1
25 TABLET, FILM COATED ORAL 2 TIMES DAILY PRN
Qty: 30 TABLET | Refills: 0 | Status: CANCELLED | OUTPATIENT
Start: 2022-07-26

## 2022-07-26 RX ORDER — ESCITALOPRAM OXALATE 5 MG/1
5 TABLET ORAL DAILY
Qty: 30 TABLET | Refills: 5 | Status: SHIPPED | OUTPATIENT
Start: 2022-07-26

## 2022-07-26 RX ORDER — LORAZEPAM 0.5 MG/1
0.5 TABLET ORAL 2 TIMES DAILY PRN
Qty: 30 TABLET | Refills: 0 | Status: SHIPPED | OUTPATIENT
Start: 2022-07-26

## 2022-07-26 NOTE — PROGRESS NOTES
[facility] PRIMARY CARE    NAME: Sheila Hay  AGE: 20 y o  SEX: female  : 2000     DATE: 2022     Assessment and Plan:     Problem List Items Addressed This Visit        Respiratory    Exercise-induced asthma     Well controlled, rare use of inhaler  Nervous and Auditory    Chronic bilateral low back pain with bilateral sciatica     MRI under care everywhere 2018  Completed PT in the past      IMPRESSION:   Impression:     There is sacralization of the L5 vertebral body  Please see discussion above  Small broad-based protrusion and mild facet degeneration at L5-S1  No   significant neural foraminal or spinal canal stenosis  Referral to spine/pain  Other    Anxiety    Relevant Medications    escitalopram (LEXAPRO) 5 mg tablet    LORazepam (Ativan) 0 5 mg tablet      Other Visit Diagnoses     Annual physical exam    -  Primary    Dizziness        Relevant Orders    Lyme Total Antibody Profile with reflex to WB    TSH, 3rd generation    T4, free    Vitamin B12    Vitamin D 25 hydroxy    CBC and differential    Iron Panel (Includes Ferritin, Iron Sat%, Iron, and TIBC)    Left foot pain        Relevant Orders    Lyme Total Antibody Profile with reflex to WB    Uric acid    Need for hepatitis C screening test        Screening for HIV (human immunodeficiency virus)        Encounter for immunization        Sleep terror        Relevant Orders    Ambulatory Referral to Sleep Medicine    Psychophysiological insomnia        Relevant Orders    Ambulatory Referral to Pain Management            Discussed symptoms in great detail with patient today  Discussed differentials  A lot of symptoms seem to be secondary to uncontrolled anxiety  If symptoms do no improve, will do more work up  Follow up with pain management and sleep medicine  Stay well hydrated  Take time while changing positions     Complete labs, these are fasting  Start lexapro, this is one pill daily  Recheck in 4-6 weeks  Limit use of advil  Can use ativan twice a day as needed for severe anxiety  Consider therapy as well  See below  Please call the office if you are experiencing any worsening of symptoms or no symptom improvement  Lightly increased sodium intake to help with blood pressure changes with positional changes  How to find a therapist:  You can call the back of your insurance card for covered providers or check on your insurance's website  Or you can visit [employer] to find a covered therapist  Bren Gray can filter by your insurance type, location, and other preferences  This will give you a list with pictures and bios about the therapists so you can appropriately choose one you feel will be the most helpful for your goals  Immunizations and preventive care screenings were discussed with patient today  Appropriate education was printed on patient's after visit summary  Patient declines vaccines today  Counseling:  Dental Health: discussed importance of regular tooth brushing, flossing, and dental visits  Exercise: the importance of regular exercise/physical activity was discussed  Recommend exercise 3-5 times per week for at least 30 minutes  Depression Screening and Follow-up Plan: Patient was screened for depression during today's encounter  They screened negative with a PHQ-2 score of 0  Return in about 1 month (around 8/26/2022) for Recheck, [address] and lab review        Chief Complaint:     Chief Complaint   Patient presents with    Physical Exam     Check up, Pt has been having lower back trouble for a while would like to discuss, she been has been having episodes of dizziness and seeing black when she stands up feels like she is going to pass out, also has been feeling very tired and fatigued all of the time stated recent labs were normal but she feels "not good"   Pt did not receive COVID vaccines       History of Present Illness:     Adult Annual Physical   Patient here for a comprehensive physical exam  The patient reports problems - fatigue  Chief Complaint   Patient presents with    Physical Exam     Check up, Pt has been having lower back trouble for a while would like to discuss, she been has been having episodes of dizziness and seeing black when she stands up feels like she is going to pass out, also has been feeling very tired and fatigued all of the time stated recent labs were normal but she feels "not good"   Pt did not receive COVID vaccines      In April she saw arthritis doctor for swelling in foot post Covid and also had labs from derm that were more allergy related due hives  She has the results on her phone of these labs  CMP was done  They also did an JASON that was negative  CRP done as well  Her foot issues resolved, it did come back once but resolved  They said to keep an eye on it  There was redness/heat with it and edema  It was painful to walk on  It was more so her toe  She wore a boot for a while  Back pain for years, was doing physical therapy on and off for it  It's getting worse again  It's not sharp pain or nerve pain but it goes down into her legs where her legs hurt  She only really notices this at night  When she is sitting or laying and stands up she gets dizzy and feels like she's going to pass out  She gets black spots in her vision  It doesn't last long but it happens all the time  Anxiety has been worsening  Never been on anything daily  Diet and Physical Activity  Diet/Nutrition: well balanced diet  Exercise: moderate cardiovascular exercise  Depression Screening  PHQ-2/9 Depression Screening    Little interest or pleasure in doing things: 0 - not at all  Feeling down, depressed, or hopeless: 0 - not at all  PHQ-2 Score: 0  PHQ-2 Interpretation: Negative depression screen       General Health  Sleep: sleeps poorly    She has been having night terror in May and a few times after but not as bad as initial event  She is very scared about this happening again  Vision: no vision problems  Dental: regular dental visits  /GYN Health  Last menstrual period: regular on birth control, skips every other month  Contraceptive method: oral contraceptives  Review of Systems:     Review of Systems   Constitutional: Positive for fatigue  Negative for chills and fever  Eyes: Negative for discharge  Respiratory: Negative for shortness of breath  Cardiovascular: Positive for palpitations  Negative for chest pain  Gastrointestinal: Negative for constipation and diarrhea  Genitourinary: Negative for difficulty urinating  Musculoskeletal: Positive for back pain  Negative for joint swelling  Skin: Negative for rash  Neurological: Positive for dizziness  Negative for headaches  Hematological: Negative for adenopathy  Psychiatric/Behavioral: The patient is not nervous/anxious  Past Medical History:     Past Medical History:   Diagnosis Date    Acne     Asthma       Past Surgical History:     History reviewed  No pertinent surgical history  Social History:     Social History     Socioeconomic History    Marital status: Single     Spouse name: None    Number of children: None    Years of education: None    Highest education level: None   Occupational History    None   Tobacco Use    Smoking status: Never Smoker    Smokeless tobacco: Never Used   Vaping Use    Vaping Use: Never used   Substance and Sexual Activity    Alcohol use:  Yes    Drug use: Never    Sexual activity: Never   Other Topics Concern    None   Social History Narrative    None     Social Determinants of Health     Financial Resource Strain: Not on file   Food Insecurity: Not on file   Transportation Needs: Not on file   Physical Activity: Not on file   Stress: Not on file   Social Connections: Not on file   Intimate Partner Violence: Not on file Housing Stability: Not on file      Family History:     Family History   Problem Relation Age of Onset   [employer] Breast cancer Mother         around age 48    Migraines Maternal Grandmother     Breast cancer Maternal Grandmother     Prostate cancer Father     Asthma Father     Cancer Maternal Grandfather     No Known Problems Maternal Aunt     No Known Problems Maternal Aunt     No Known Problems Paternal Aunt       Current Medications:     Current Outpatient Medications   Medication Sig Dispense Refill    Albuterol Sulfate 108 (90 Base) MCG/ACT AEPB Inhale 2 puffs every 6 (six) hours as needed (wheezing/ shortness of breath/ prior to exercise) 1 each 1    drospirenone-ethinyl estradiol (RAQUEL) 3-0 02 MG per tablet TAKE 1 TABLET DAILY 84 tablet 0    escitalopram (LEXAPRO) 5 mg tablet Take 1 tablet (5 mg total) by mouth daily 30 tablet 5    LORazepam (Ativan) 0 5 mg tablet Take 1 tablet (0 5 mg total) by mouth 2 (two) times a day as needed for anxiety 30 tablet 0    Tazorac 0 05 % cream APPLY TO THE FACE 2 TO 3 NIGHTS A WEEK AFTER MOISTURIZER      tretinoin (ALTRALIN) 0 05 % APPLY PEA SIZED AMOUNT TO ENTIRE FACE EVERY NIGHT AT BEDTIME 1 HOUR AFTER WASHING FACE       No current facility-administered medications for this visit  Allergies:     No Known Allergies   Physical Exam:     /78 (BP Location: Left arm, Patient Position: Sitting, Cuff Size: Adult)   Pulse 80   Temp (!) 97 °F (36 1 °C) (Temporal)   Resp 16   Ht 5' 6 75" (1 695 m)   Wt 61 2 kg (135 lb)   SpO2 99%   BMI 21 30 kg/m²     Physical Exam  Vitals and nursing note reviewed  Constitutional:       General: She is not in acute distress  Appearance: Normal appearance  She is not ill-appearing, toxic-appearing or diaphoretic  HENT:      Head: Normocephalic and atraumatic        Right Ear: External ear normal       Left Ear: External ear normal       Nose: Nose normal       Mouth/Throat:      Mouth: Mucous membranes are moist  Pharynx: Oropharynx is clear  Posterior oropharyngeal erythema present  No oropharyngeal exudate  Eyes:      General: Lids are normal  No scleral icterus  Right eye: No discharge  Left eye: No discharge  Extraocular Movements: Extraocular movements intact  Conjunctiva/sclera: Conjunctivae normal       Pupils: Pupils are equal, round, and reactive to light  Cardiovascular:      Rate and Rhythm: Normal rate and regular rhythm  No extrasystoles are present  Heart sounds: S1 normal and S2 normal  No murmur heard  Pulmonary:      Effort: Pulmonary effort is normal  No respiratory distress  Breath sounds: Normal breath sounds  No stridor or decreased air movement  No wheezing or rhonchi  Musculoskeletal:         General: Normal range of motion  Cervical back: Normal range of motion  Skin:     General: Skin is warm and dry  Neurological:      General: No focal deficit present  Mental Status: She is alert and oriented to person, place, and time  Mental status is at baseline  Cranial Nerves: No cranial nerve deficit  Sensory: No sensory deficit  Motor: No weakness  Coordination: Coordination normal       Gait: Gait normal    Psychiatric:         Attention and Perception: Attention and perception normal          Mood and Affect: Mood and affect normal          Speech: Speech normal          Behavior: Behavior is cooperative           Cognition and Memory: Cognition normal          Judgment: Judgment normal           Augustina Stanford Oanh, [address]

## 2022-07-26 NOTE — PATIENT INSTRUCTIONS
Follow up with pain management and sleep medicine  Stay well hydrated  Take time while changing positions  Complete labs, these are fasting  Start lexapro, this is one pill daily  Recheck in 4-6 weeks  Limit use of advil  Can use ativan twice a day as needed for severe anxiety  Consider therapy as well  See below  Please call the office if you are experiencing any worsening of symptoms or no symptom improvement  How to find a therapist:  You can call the back of your insurance card for covered providers or check on your insurance's website  Or you can visit [employer] to find a covered therapist  Aicha Kuhn can filter by your insurance type, location, and other preferences  This will give you a list with pictures and bios about the therapists so you can appropriately choose one you feel will be the most helpful for your goals  Wellness Visit for Adults   AMBULATORY CARE:   A wellness visit  is when you see your healthcare provider to get screened for health problems  Your healthcare provider will also give you advice on how to stay healthy  Write down your questions so you remember to ask them  Ask your healthcare provider how often you should have a wellness visit  What happens at a wellness visit:  Your healthcare provider will ask about your health, and your family history of health problems  This includes high blood pressure, heart disease, and cancer  He or she will ask if you have symptoms that concern you, if you smoke, and about your mood  You may also be asked about your intake of medicines, supplements, food, and alcohol  Any of the following may be done: Your weight  will be checked  Your height may also be checked so your body mass index (BMI) can be calculated  Your BMI shows if you are at a healthy weight  Your blood pressure  and heart rate will be checked  Your temperature may also be checked  Blood and urine tests  may be done   Blood tests may be done to check your cholesterol levels  Abnormal cholesterol levels increase your risk for heart disease and stroke  You may also need a blood or urine test to check for diabetes if you are at increased risk  Urine tests may be done to look for signs of an infection or kidney disease  A physical exam  includes checking your heartbeat and lungs with a stethoscope  Your healthcare provider may also check your skin to look for sun damage  Screening tests  may be recommended  A screening test is done to check for diseases that may not cause symptoms  The screening tests you may need depend on your age, gender, family history, and lifestyle habits  For example, colorectal screening may be recommended if you are 48years old or older  Screening tests you need if you are a woman:   A Pap smear  is used to screen for cervical cancer  Pap smears are usually done every 3 to 5 years depending on your age  You may need them more often if you have had abnormal Pap smear test results in the past  Ask your healthcare provider how often you should have a Pap smear  A mammogram  is an x-ray of your breasts to screen for breast cancer  Experts recommend mammograms every 2 years starting at age 48 years  You may need a mammogram at age 52 years or younger if you have an increased risk for breast cancer  Talk to your healthcare provider about when you should start having mammograms and how often you need them  Vaccines you may need:   Get an influenza vaccine  every year  The influenza vaccine protects you from the flu  Several types of viruses cause the flu  The viruses change over time, so new vaccines are made each year  Get a tetanus-diphtheria (Td) booster vaccine  every 10 years  This vaccine protects you against tetanus and diphtheria  Tetanus is a severe infection that may cause painful muscle spasms and lockjaw   Diphtheria is a severe bacterial infection that causes a thick covering in the back of your mouth and throat  Get a human papillomavirus (HPV) vaccine  if you are female and aged 23 to 32 or male 23 to 24 and never received it  This vaccine protects you from HPV infection  HPV is the most common infection spread by sexual contact  HPV may also cause vaginal, penile, and anal cancers  Get a pneumococcal vaccine  if you are aged 72 years or older  The pneumococcal vaccine is an injection given to protect you from pneumococcal disease  Pneumococcal disease is an infection caused by pneumococcal bacteria  The infection may cause pneumonia, meningitis, or an ear infection  Get a shingles vaccine  if you are 60 or older, even if you have had shingles before  The shingles vaccine is an injection to protect you from the varicella-zoster virus  This is the same virus that causes chickenpox  Shingles is a painful rash that develops in people who had chickenpox or have been exposed to the virus  How to eat healthy:  My Plate is a model for planning healthy meals  It shows the types and amounts of foods that should go on your plate  Fruits and vegetables make up about half of your plate, and grains and protein make up the other half  A serving of dairy is included on the side of your plate  The amount of calories and serving sizes you need depends on your age, gender, weight, and height  Examples of healthy foods are listed below:  Eat a variety of vegetables  such as dark green, red, and orange vegetables  You can also include canned vegetables low in sodium (salt) and frozen vegetables without added butter or sauces  Eat a variety of fresh fruits , canned fruit in 100% juice, frozen fruit, and dried fruit  Include whole grains  At least half of the grains you eat should be whole grains   Examples include whole-wheat bread, wheat pasta, brown rice, and whole-grain cereals such as oatmeal     Eat a variety of protein foods such as seafood (fish and shellfish), lean meat, and poultry without skin (turkey and chicken)  Examples of lean meats include pork leg, shoulder, or tenderloin, and beef round, sirloin, tenderloin, and extra lean ground beef  Other protein foods include eggs and egg substitutes, beans, peas, soy products, nuts, and seeds  Choose low-fat dairy products such as skim or 1% milk or low-fat yogurt, cheese, and cottage cheese  Limit unhealthy fats  such as butter, hard margarine, and shortening  Exercise:  Exercise at least 30 minutes per day on most days of the week  Some examples of exercise include walking, biking, dancing, and swimming  You can also fit in more physical activity by taking the stairs instead of the elevator or parking farther away from stores  Include muscle strengthening activities 2 days each week  Regular exercise provides many health benefits  It helps you manage your weight, and decreases your risk for type 2 diabetes, heart disease, stroke, and high blood pressure  Exercise can also help improve your mood  Ask your healthcare provider about the best exercise plan for you  General health and safety guidelines:   Do not smoke  Nicotine and other chemicals in cigarettes and cigars can cause lung damage  Ask your healthcare provider for information if you currently smoke and need help to quit  E-cigarettes or smokeless tobacco still contain nicotine  Talk to your healthcare provider before you use these products  Limit alcohol  A drink of alcohol is 12 ounces of beer, 5 ounces of wine, or 1½ ounces of liquor  Lose weight, if needed  Being overweight increases your risk of certain health conditions  These include heart disease, high blood pressure, type 2 diabetes, and certain types of cancer  Protect your skin  Do not sunbathe or use tanning beds  Use sunscreen with a SPF 15 or higher  Apply sunscreen at least 15 minutes before you go outside  Reapply sunscreen every 2 hours   Wear protective clothing, hats, and sunglasses when you are outside  Drive safely  Always wear your seatbelt  Make sure everyone in your car wears a seatbelt  A seatbelt can save your life if you are in an accident  Do not use your cell phone when you are driving  This could distract you and cause an accident  Pull over if you need to make a call or send a text message  Practice safe sex  Use latex condoms if are sexually active and have more than one partner  Your healthcare provider may recommend screening tests for sexually transmitted infections (STIs)  Wear helmets, lifejackets, and protective gear  Always wear a helmet when you ride a bike or motorcycle, go skiing, or play sports that could cause a head injury  Wear protective equipment when you play sports  Wear a lifejacket when you are on a boat or doing water sports  © Copyright [employer] 2022 Information is for End User's use only and may not be sold, redistributed or otherwise used for commercial purposes  All illustrations and images included in CareNotes® are the copyrighted property of A D A M [employer] Inc  or [facility] Terrence Mccann   The above information is an  only  It is not intended as medical advice for individual conditions or treatments  Talk to your doctor, nurse or pharmacist before following any medical regimen to see if it is safe and effective for you

## 2022-07-26 NOTE — ASSESSMENT & PLAN NOTE
MRI under care everywhere 2018  Completed PT in the past      IMPRESSION:   Impression:     There is sacralization of the L5 vertebral body  Please see discussion above  Small broad-based protrusion and mild facet degeneration at L5-S1  No   significant neural foraminal or spinal canal stenosis  Referral to spine/pain

## 2022-07-27 DIAGNOSIS — L70.0 ACNE VULGARIS: ICD-10-CM

## 2022-07-27 DIAGNOSIS — N92.6 IRREGULAR MENSES: ICD-10-CM

## 2022-07-28 RX ORDER — DROSPIRENONE AND ETHINYL ESTRADIOL 0.02-3(28)
KIT ORAL
Qty: 84 TABLET | Refills: 3 | Status: SHIPPED | OUTPATIENT
Start: 2022-07-28

## 2022-07-30 LAB
25(OH)D3 SERPL-MCNC: 36 NG/ML (ref 30–100)
B BURGDOR AB SER IA-ACNC: <0.9 INDEX
BASOPHILS # BLD AUTO: 17 CELLS/UL (ref 0–200)
BASOPHILS NFR BLD AUTO: 0.3 %
EOSINOPHIL # BLD AUTO: 57 CELLS/UL (ref 15–500)
EOSINOPHIL NFR BLD AUTO: 1 %
ERYTHROCYTE [DISTWIDTH] IN BLOOD BY AUTOMATED COUNT: 13 % (ref 11–15)
FERRITIN SERPL-MCNC: 18 NG/ML (ref 16–154)
HCT VFR BLD AUTO: 40.8 % (ref 35–45)
HGB BLD-MCNC: 13.6 G/DL (ref 11.7–15.5)
IRON SATN MFR SERPL: 13 % (CALC) (ref 16–45)
IRON SERPL-MCNC: 76 MCG/DL (ref 40–190)
LYMPHOCYTES # BLD AUTO: 2713 CELLS/UL (ref 850–3900)
LYMPHOCYTES NFR BLD AUTO: 47.6 %
MCH RBC QN AUTO: 28.3 PG (ref 27–33)
MCHC RBC AUTO-ENTMCNC: 33.3 G/DL (ref 32–36)
MCV RBC AUTO: 84.8 FL (ref 80–100)
MONOCYTES # BLD AUTO: 371 CELLS/UL (ref 200–950)
MONOCYTES NFR BLD AUTO: 6.5 %
NEUTROPHILS # BLD AUTO: 2542 CELLS/UL (ref 1500–7800)
NEUTROPHILS NFR BLD AUTO: 44.6 %
PLATELET # BLD AUTO: 231 THOUSAND/UL (ref 140–400)
PMV BLD REES-ECKER: 10.6 FL (ref 7.5–12.5)
RBC # BLD AUTO: 4.81 MILLION/UL (ref 3.8–5.1)
T4 FREE SERPL-MCNC: 1.3 NG/DL (ref 0.8–1.8)
TIBC SERPL-MCNC: 576 MCG/DL (CALC) (ref 250–450)
TSH SERPL-ACNC: 3 MIU/L
URATE SERPL-MCNC: 5.4 MG/DL (ref 2.5–7)
VIT B12 SERPL-MCNC: 430 PG/ML (ref 200–1100)
WBC # BLD AUTO: 5.7 THOUSAND/UL (ref 3.8–10.8)

## 2022-08-25 ENCOUNTER — OFFICE VISIT (OUTPATIENT)
Dept: FAMILY MEDICINE CLINIC | Facility: CLINIC | Age: 22
End: 2022-08-25
Payer: COMMERCIAL

## 2022-08-25 VITALS
TEMPERATURE: 97.8 F | HEIGHT: 67 IN | HEART RATE: 101 BPM | OXYGEN SATURATION: 99 % | SYSTOLIC BLOOD PRESSURE: 120 MMHG | BODY MASS INDEX: 21.5 KG/M2 | WEIGHT: 137 LBS | DIASTOLIC BLOOD PRESSURE: 80 MMHG | RESPIRATION RATE: 16 BRPM

## 2022-08-25 DIAGNOSIS — F41.9 ANXIETY: Primary | ICD-10-CM

## 2022-08-25 PROCEDURE — 99215 OFFICE O/P EST HI 40 MIN: CPT | Performed by: NURSE PRACTITIONER

## 2022-08-25 NOTE — PROGRESS NOTES
Assessment/Plan:   Diagnosis ICD-10-CM Associated Orders   1  Anxiety  F41 9        Discussed anxiety and treatment options in great detail with patient today  Answered all questions about the medication and medication options  Reviewed gene sight testing as an option  Continue with ativan PRN at this time  She will call when she starts the lexapro and set up f/u appointments that week for checks to make sure she is doing well/feeling well on it without any side effects  I strongly encouraged she start therapy as soon as possible to help with this decision/ transition as she needs to feel comfortable with it  >40 minutes spent on visit > 50% time spent counseling  Advised to call the office for any worsening of symptoms or no symptom improvement  Patient verbalizes understand and agrees with treatment plan  Diagnoses and all orders for this visit:    Anxiety              Subjective:        Patient ID: Lynnette Rodriguez is a 20 y o  female  Chief Complaint   Patient presents with    Follow-up     1 mo re ck mental health/lab review       Patient presents with: Follow-up: 1 mo re ck mental health/lab review  1 month ago was started on 5 mg Lexapro and ativan PRN  She hasn't started this yet because she is so anxious about starting it  Her mom is against medication and feels it can be fixed with healthy lifestyles  Note to Juilo Beasley about labs:  "Your blood count, uric acid, thyroid, vitamin D, and vitamin B12 are all normal  Your Lyme screening was negative  Your iron was a little on the lower side, I would recommend either increasing daily dietary iron or start flintstone chewable + iron multivitamin Daily  We can also discuss this more in depth at next visit this month  "     She started iron supplement  Hasn't noticed any difference in her anxiety  She would like to go over questions regarding lexapro and starting this  She doesn't have good support about starting medication with friends/family   She hasn't started therapy yet  The following portions of the patient's history were reviewed and updated as appropriate: allergies, current medications, past family history, past social history and problem list     Review of Systems   Constitutional: Negative for chills and fever  Eyes: Negative for discharge  Respiratory: Negative for shortness of breath  Cardiovascular: Negative for chest pain  Gastrointestinal: Negative for constipation and diarrhea  Genitourinary: Negative for difficulty urinating  Musculoskeletal: Negative for joint swelling  Skin: Negative for rash  Neurological: Negative for headaches  Hematological: Negative for adenopathy  Psychiatric/Behavioral: The patient is nervous/anxious  Objective:  /80   Pulse 101   Temp 97 8 °F (36 6 °C) (Temporal)   Resp 16   Ht 5' 6 75" (1 695 m)   Wt 62 1 kg (137 lb)   SpO2 99%   BMI 21 62 kg/m²      Physical Exam  Vitals and nursing note reviewed  Constitutional:       General: She is not in acute distress  Appearance: She is well-developed  She is not diaphoretic  HENT:      Head: Normocephalic and atraumatic  Right Ear: External ear normal       Left Ear: External ear normal    Eyes:      General: Lids are normal          Right eye: No discharge  Left eye: No discharge  Conjunctiva/sclera: Conjunctivae normal    Pulmonary:      Effort: Pulmonary effort is normal  No respiratory distress  Musculoskeletal:         General: No deformity  Cervical back: Neck supple  Skin:     General: Skin is warm and dry  Neurological:      Mental Status: She is alert and oriented to person, place, and time  Psychiatric:         Mood and Affect: Mood is anxious  Speech: Speech normal          Behavior: Behavior normal          Thought Content:  Thought content normal          Judgment: Judgment normal                 Current Outpatient Medications:     Albuterol Sulfate 108 (90 Base) MCG/ACT AEPB, Inhale 2 puffs every 6 (six) hours as needed (wheezing/ shortness of breath/ prior to exercise), Disp: 1 each, Rfl: 1    drospirenone-ethinyl estradiol (RAQUEL) 3-0 02 MG per tablet, TAKE 1 TABLET DAILY, Disp: 84 tablet, Rfl: 3    LORazepam (Ativan) 0 5 mg tablet, Take 1 tablet (0 5 mg total) by mouth 2 (two) times a day as needed for anxiety, Disp: 30 tablet, Rfl: 0    Tazorac 0 05 % cream, APPLY TO THE FACE 2 TO 3 NIGHTS A WEEK AFTER MOISTURIZER, Disp: , Rfl:     escitalopram (LEXAPRO) 5 mg tablet, Take 1 tablet (5 mg total) by mouth daily (Patient not taking: Reported on 8/25/2022), Disp: 30 tablet, Rfl: 5    tretinoin (ALTRALIN) 0 05 %, APPLY PEA SIZED AMOUNT TO ENTIRE FACE EVERY NIGHT AT BEDTIME 1 HOUR AFTER WASHING FACE (Patient not taking: Reported on 8/25/2022), Disp: , Rfl:   No Known Allergies

## 2022-08-25 NOTE — PATIENT INSTRUCTIONS
Continue to pursue therapy  Call when you start medication  Please call the office if you are experiencing any worsening of symptoms or no symptom improvement

## 2022-09-02 ENCOUNTER — TELEPHONE (OUTPATIENT)
Dept: FAMILY MEDICINE CLINIC | Facility: CLINIC | Age: 22
End: 2022-09-02

## 2022-09-02 NOTE — TELEPHONE ENCOUNTER
Harshil Rea called the office to make Rosas Garibay aware that she annalisa begun  taking escitalopram  5 mg on 09/01/22  Pt is aware Rosas Garibay is out of the office today and will return Tuesday 09/06/22 and see this message then  Pt expressed verbal understanding  Pt will call if she needs anything     Pt's number is [phone]

## 2022-09-06 NOTE — TELEPHONE ENCOUNTER
Pt states she is doing  ok on the med she states at the beginning she was a little anxious taking it, but now she is feeling ok

## 2022-10-13 ENCOUNTER — CONSULT (OUTPATIENT)
Dept: PAIN MEDICINE | Facility: MEDICAL CENTER | Age: 22
End: 2022-10-13
Payer: COMMERCIAL

## 2022-10-13 VITALS
HEIGHT: 67 IN | SYSTOLIC BLOOD PRESSURE: 112 MMHG | BODY MASS INDEX: 21.66 KG/M2 | OXYGEN SATURATION: 99 % | DIASTOLIC BLOOD PRESSURE: 70 MMHG | HEART RATE: 91 BPM | WEIGHT: 138 LBS

## 2022-10-13 DIAGNOSIS — M54.42 CHRONIC BILATERAL LOW BACK PAIN WITH BILATERAL SCIATICA: ICD-10-CM

## 2022-10-13 DIAGNOSIS — M54.41 CHRONIC BILATERAL LOW BACK PAIN WITH BILATERAL SCIATICA: ICD-10-CM

## 2022-10-13 DIAGNOSIS — G89.29 CHRONIC BILATERAL LOW BACK PAIN WITH BILATERAL SCIATICA: ICD-10-CM

## 2022-10-13 DIAGNOSIS — M54.16 LUMBAR RADICULITIS: Primary | ICD-10-CM

## 2022-10-13 PROCEDURE — 99244 OFF/OP CNSLTJ NEW/EST MOD 40: CPT | Performed by: PHYSICAL MEDICINE & REHABILITATION

## 2022-10-13 RX ORDER — GABAPENTIN 300 MG/1
300 CAPSULE ORAL
Qty: 30 CAPSULE | Refills: 2 | Status: SHIPPED | OUTPATIENT
Start: 2022-10-13 | End: 2022-11-12

## 2022-10-13 NOTE — PATIENT INSTRUCTIONS
Lumbar Disc Herniation   AMBULATORY CARE:   Lumbar disc herniation  occurs when a disc in your lumbar spine (lower back) bulges out  Lumbar discs are spongy cushions between the vertebrae (bones) in your spine  The herniated disc may press on your nerves or spinal cord  Common signs and symptoms include the following:  Mild lumbar disc herniation may not cause any signs or symptoms  You may have any of the following if the herniated disc presses against your nerves or spinal cord:  Pain in your lower back, buttocks, groin, or legs    Burning, stabbing, or tingling pain that shoots down one or both of your legs    Numbness or weakness in one leg    Trouble walking or moving your feet or toes    Seek care immediately if:   You cannot control when you urinate or have a bowel movement  You are unable to move one or both of your legs  You lose feeling in your groin or buttocks  Contact your healthcare provider if:   You have numbness in one or both of your legs  You have low back pain while resting  You have trouble moving one or both of your legs  You begin leaking urine or bowel movement, and it is not normal for you  Your pain gets worse, even after you take medicine  You have questions or concerns about your condition or care  Treatment:  Your healthcare provider may have you rest in bed for a few days  It is best to rest on your side with your knees bent  Put a cushion between your knees to help decrease the pressure on your spine and nerves  You may also need any of following:  NSAIDs , such as ibuprofen, help decrease swelling, pain, and fever  NSAIDs can cause stomach bleeding or kidney problems in certain people  If you take blood thinner medicine, always ask your healthcare provider if NSAIDs are safe for you  Always read the medicine label and follow directions  Prescription pain medicine  may be given  Ask how to take this medicine safely      Muscle relaxers  decrease pain and muscle spasms  A steroid injection  may be given to reduce inflammation  Steroid medicine is injected into the epidural space  The epidural space is between your spinal cord and vertebrae  You may be given pain medicine along with the steroids  Physical therapy  may be recommended by your healthcare provider  A physical therapist teaches you exercises to help improve movement and strength, and to decrease pain  A physical therapist can teach you safe ways to bend, lift, sit, and stand to help relieve back pain  Surgery  may be needed to fix your herniated disc if other treatments have failed  Surgery may be done to remove your herniated disc and make your spine stronger  Surgery may also be done to decrease pressure on your nerves and spinal cord  Manage pain from a lumbar disc herniation:   Apply heat  on your lower back for 20 to 30 minutes every 2 hours for as many days as directed  Heat helps decrease pain and muscle spasms  Do low-stress exercise and activity  Exercises that do not stress your back muscles may help decrease your pain  Examples of low-stress exercises are walking, swimming, and biking  Avoid heavy lifting while your back is healing  Try not to sit for long periods of time  Talk to your healthcare provider before you start any new exercise program     Follow up with your doctor as directed:  Write down your questions so you remember to ask them during your visits  © Copyright [employer] 2022 Information is for End User's use only and may not be sold, redistributed or otherwise used for commercial purposes  All illustrations and images included in CareNotes® are the copyrighted property of A [employer]  or Debra Ann  The above information is an  only  It is not intended as medical advice for individual conditions or treatments   Talk to your doctor, nurse or pharmacist before following any medical regimen to see if it is safe and effective for you

## 2022-10-13 NOTE — PROGRESS NOTES
Assessment  1  Lumbar radiculitis    2  Chronic bilateral low back pain with bilateral sciatica        Plan  1  Initiate gabapentin 300 mg q h s   2  Initiate physical therapy  3  Follow-up in 6 weeks at the completion of physical therapy and if she continues to have pain would recommend obtaining updated MRI of the lumbar spine at that time    My impressions and treatment recommendations were discussed in detail with the patient who verbalized understanding and had no further questions  Discharge instructions were provided  I personally saw and examined the patient and I agree with the above discussed plan of care  Orders Placed This Encounter   Procedures   • Ambulatory referral to Physical Therapy     Standing Status:   Future     Standing Expiration Date:   10/13/2023     Referral Priority:   Routine     Referral Type:   Physical Therapy     Referral Reason:   Specialty Services Required     Requested Specialty:   Physical Therapy     Number of Visits Requested:   1     Expiration Date:   10/13/2023     New Medications Ordered This Visit   Medications   • gabapentin (NEURONTIN) 300 mg capsule     Sig: Take 1 capsule (300 mg total) by mouth daily at bedtime     Dispense:  30 capsule     Refill:  2       History of Present Illness    Viviana Landaverde is a 20 y o  female seen in consultation at the request of AALIYAH Armstrong regarding back and radiating leg pain  The patient has been experiencing symptoms for greater than 10 years without any inciting event or trauma  She is describing moderate to severe intensity of symptoms currently rating her pain as a 4 to 5/10 which is nearly constant worse at nighttime  This is described as a painful numbness as well as sharp, dull, aching type pain  She does describe subjective lower extremity weakness but does not require an assistive device for ambulation      She is localizing pain to her axial lumbar spine with radiation down the backs of both legs     Aggravating factors include lying down bending walking and exercise  Alleviating factors are unknown  She did have an MRI of the lumbar spine in 2018  Please see report for full details  I do not have the images to review  She has noted some moderate relief with local application of heat or ice as well as with physical therapy in the past   She has also tried exercise and acupuncture without relief  Social history negative for tobacco marijuana use positive for occasional alcohol consumption  Currently using acetaminophen and ibuprofen and has not tried neuropathic pain medications  I have personally reviewed and/or updated the patient's past medical history, past surgical history, family history, social history, current medications, allergies, and vital signs today  Review of Systems   Constitutional: Negative for fever and unexpected weight change  HENT: Negative for trouble swallowing  Eyes: Negative for visual disturbance  Respiratory: Negative for shortness of breath and wheezing  Cardiovascular: Negative for chest pain and palpitations  Gastrointestinal: Negative for constipation, diarrhea, nausea and vomiting  Endocrine: Negative for cold intolerance, heat intolerance and polydipsia  Genitourinary: Negative for difficulty urinating and frequency  Musculoskeletal: Positive for back pain and myalgias  Negative for arthralgias, gait problem and joint swelling  Skin: Negative for rash  Neurological: Negative for dizziness, seizures, syncope, weakness and headaches  Hematological: Does not bruise/bleed easily  Psychiatric/Behavioral: Negative for dysphoric mood  All other systems reviewed and are negative        Patient Active Problem List   Diagnosis   • Raynaud's disease without gangrene   • Paresthesia   • Atypical chest pain   • Palpitations   • Acne   • Exercise-induced asthma   • Chronic bilateral low back pain with bilateral sciatica   • Anxiety Past Medical History:   Diagnosis Date   • Acne    • Angina at rest [facility])    • Anxiety    • Asthma        History reviewed  No pertinent surgical history  Family History   Problem Relation Age of Onset   • Breast cancer Mother         around age 48   • Migraines Maternal Grandmother    • Breast cancer Maternal Grandmother    • Prostate cancer Father    • Asthma Father    • Cancer Maternal Grandfather    • No Known Problems Maternal Aunt    • No Known Problems Maternal Aunt    • No Known Problems Paternal Aunt        Social History     Occupational History   • Not on file   Tobacco Use   • Smoking status: Never Smoker   • Smokeless tobacco: Never Used   Vaping Use   • Vaping Use: Never used   Substance and Sexual Activity   • Alcohol use: Yes   • Drug use: Never   • Sexual activity: Never       Current Outpatient Medications on File Prior to Visit   Medication Sig   • Albuterol Sulfate 108 (90 Base) MCG/ACT AEPB Inhale 2 puffs every 6 (six) hours as needed (wheezing/ shortness of breath/ prior to exercise)   • drospirenone-ethinyl estradiol (RAQUEL) 3-0 02 MG per tablet TAKE 1 TABLET DAILY   • escitalopram (LEXAPRO) 5 mg tablet Take 1 tablet (5 mg total) by mouth daily   • LORazepam (Ativan) 0 5 mg tablet Take 1 tablet (0 5 mg total) by mouth 2 (two) times a day as needed for anxiety   • Tazorac 0 05 % cream APPLY TO THE FACE 2 TO 3 NIGHTS A WEEK AFTER MOISTURIZER   • tretinoin (ALTRALIN) 0 05 % APPLY PEA SIZED AMOUNT TO ENTIRE FACE EVERY NIGHT AT BEDTIME 1 HOUR AFTER WASHING FACE (Patient not taking: No sig reported)     No current facility-administered medications on file prior to visit  No Known Allergies    Physical Exam    /70   Pulse 91   Ht 5' 7" (1 702 m)   Wt 62 6 kg (138 lb)   SpO2 99%   BMI 21 61 kg/m²     LUMBAR  General: Well-developed, well-nourished individual in no acute distress  Mental: Appropriate mood and affect   Grossly oriented with coherent speech and thought processing      Neuro:  Cranial nerves: Cranial nerve function is grossly intact bilaterally   Strength: Bilateral lower extremity strength is normal and symmetric   No atrophy or tone abnormalities noted   Reflexes: Bilateral lower extremity muscle stretch reflexes are graded 3+    No ankle clonus is noted   Sensation: No loss of sensation is noted   SLR/Foraminal Compression Maneuvers: Straight leg raising in the sitting  position is negative  for radicular pain   Gait:  Gait/gross motor: Gait is normal  Station is normal  Toe walking, heel walking  are normal     Musculoskeletal:  Palpation: Inspection and palpation of the spine and extremities are unremarkable   Spine: Normal pain-free range of motion except for end range forward flexion and lumbar extension both which are slightly limited in reproduce some of her pain  No gross axial skeletal deformities   Skin: Skin inspection grossly negative for erythema, breakdown, or concerning lesions in affected area   Lymph: No lymphadenopathy is appreciated in the involved extremity   Vessels: No lower extremity edema   Lungs: Breathing is comfortable and regular  No dyspnea noted during examination   Eyes: Visual field grossly intact to confrontation  No redness appreciated  ENT: No craniofacial deformities or asymmetry  No neck masses appreciated  Imaging    MRI LUMBAR SPINE WO CONTRAST      Impression    Impression:     There is sacralization of the L5 vertebral body  Please see discussion above  Small broad-based protrusion and mild facet degeneration at L5-S1  No   significant neural foraminal or spinal canal stenosis  Workstation:[id number]    Narrative    History: Lumbar radiculopathy  Procedure: MRI of the lumbar spine was performed without contrast      Comparison: Correlation is made with a radiograph dated 7/25/2018     Findings: There is sacralization of the L5 vertebral body     The first   nonrib-bearing vertebral level is designated L1  The last fully formed disc is   designated L5-S1  Conus and lower thoracic cord: The conus is normal in position and signal   intensity  Alignment: normal     Marrow: normal     Vertebral Bodies: Normal      Disk Spaces: There is mild degeneration of the L5-S1 intervertebral disc  L1-L2: There is no spinal stenosis or foraminal narrowing  L2-L3 : There is no spinal stenosis or foraminal narrowing  L3-L4: There is no spinal stenosis or foraminal narrowing  L4-L5: There is no spinal stenosis or foraminal narrowing  L5-S1: Small broad-based protrusion, minimal ligamentum flavum thickening and   facet degeneration  There is no neuroforaminal narrowing     Other Result Text    Interface, Rad Results In - 08/02/2018  1:14 PM EDT   History: Lumbar radiculopathy  Procedure: MRI of the lumbar spine was performed without contrast      Comparison: Correlation is made with a radiograph dated 7/25/2018     Findings: There is sacralization of the L5 vertebral body    The first   nonrib-bearing vertebral level is designated L1  The last fully formed disc is   designated L5-S1  Conus and lower thoracic cord: The conus is normal in position and signal   intensity  Alignment: normal     Marrow: normal     Vertebral Bodies: Normal      Disk Spaces: There is mild degeneration of the L5-S1 intervertebral disc  L1-L2: There is no spinal stenosis or foraminal narrowing  L2-L3 : There is no spinal stenosis or foraminal narrowing  L3-L4: There is no spinal stenosis or foraminal narrowing  L4-L5: There is no spinal stenosis or foraminal narrowing  L5-S1: Small broad-based protrusion, minimal ligamentum flavum thickening and   facet degeneration  There is no neuroforaminal narrowing     IMPRESSION:   Impression:     There is sacralization of the L5 vertebral body  Please see discussion above       Small broad-based protrusion and mild facet degeneration at L5-S1  No   significant neural foraminal or spinal canal stenosis               Workstation:[id number]  Specimen Collected: -- Last Resulted: --   Date: 08/02/18    Received From: [facility]

## 2022-10-27 ENCOUNTER — EVALUATION (OUTPATIENT)
Dept: PHYSICAL THERAPY | Facility: REHABILITATION | Age: 22
End: 2022-10-27
Payer: COMMERCIAL

## 2022-10-27 DIAGNOSIS — M54.42 CHRONIC BILATERAL LOW BACK PAIN WITH BILATERAL SCIATICA: Primary | ICD-10-CM

## 2022-10-27 DIAGNOSIS — M54.41 CHRONIC BILATERAL LOW BACK PAIN WITH BILATERAL SCIATICA: Primary | ICD-10-CM

## 2022-10-27 DIAGNOSIS — M54.16 LUMBAR RADICULITIS: ICD-10-CM

## 2022-10-27 DIAGNOSIS — G89.29 CHRONIC BILATERAL LOW BACK PAIN WITH BILATERAL SCIATICA: Primary | ICD-10-CM

## 2022-10-27 PROCEDURE — 97161 PT EVAL LOW COMPLEX 20 MIN: CPT | Performed by: PHYSICAL THERAPIST

## 2022-10-27 PROCEDURE — 97110 THERAPEUTIC EXERCISES: CPT | Performed by: PHYSICAL THERAPIST

## 2022-10-27 NOTE — PROGRESS NOTES
PT Evaluation     Today's date: 10/27/2022  Patient name: Wilmer Luna  : 2000   MRN: [id number]  Referring provider: Tika Murray DO  Dx:   Encounter Diagnosis     ICD-10-CM    1  Chronic bilateral low back pain with bilateral sciatica  M54 42 Ambulatory referral to Physical Therapy    M54 41     G89 29    2  Lumbar radiculitis  M54 16 Ambulatory referral to Physical Therapy       Start Time: 1310  Stop Time: 1405  Total time in clinic (min): 55 minutes    Assessment  Assessment details: Wilmer Luna is a 20 y o  female presenting with chronic LBP, R>L  Primary impairments include LBP with flexion and lying supine  Will benefit from skilled PT interventions for 6 weeks, 1:1 with ARTEM Duron under direct supervision of Nadine Duarte DPT for entirety of tx  HEP was provided and reviewed  Patient is able to complete HEP with good technique and appropriate pain response  Patient expressed understanding of appropriate dosage and frequency of HEP  No additional referral necessary  Impairments: abnormal coordination, abnormal muscle firing, abnormal or restricted ROM, abnormal movement, activity intolerance, impaired physical strength, lacks appropriate home exercise program and pain with function  Functional limitations: difficulty sleeping, difficulty completing iADLs  Symptom irritability: moderateUnderstanding of Dx/Px/POC: good   Prognosis: good    Goals    Short Term Goals: In 3 weeks, the patient will:  1  Active SLR to 90* b/l without pain  2  Walk 0 5mi without pain  3  Supervision with HEP for self care    Long Term Goals: In 6 weeks, the patient will:  1  Vacuum her house without pain  2  Walk 1mi without pain  3  FOTO to greater than predicted value  4   Independent with HEP for selfcare      Plan  Patient would benefit from: skilled physical therapy  Referral necessary: No  Planned therapy interventions: abdominal trunk stabilization, joint mobilization, manual therapy, ADL retraining, balance, motor coordination training, balance/weight bearing training, neuromuscular re-education, behavior modification, body mechanics training, patient education, postural training, self care, coordination, strengthening, stretching, therapeutic activities, therapeutic exercise, therapeutic training, home exercise program, graded exercise, graded activity, functional ROM exercises and flexibility  Frequency: 2x week  Duration in weeks: 6  Plan of Care beginning date: 10/27/2022  Treatment plan discussed with: patient        Subjective       Pain Location: paraspinals around L4/5, pain shoots up back to neck and lasts a few seconds; "numb" painful LEs at night,   Pain Intensity: worst pain 7/10 in past 2 weeks, 2/10 today, constant pain, worse with poor sleep  LEOPOLDO: no known mechanism  DOI: gradual onset, states she has had LBP since age 14 from when she played soccer in high school  Provoked by: vacuuming, walking, ascending stairs  Eased Positions: lying prone  Living Situation: at home with parents  Constitutional S/S: HA  PLOF: occasional workouts    Objective    Vital Signs: SpO2 99%, 78bpm, 116/83mmHg        Red Flags: none    Standing Posture & Lower Extremity Alignment:  Structure/Joint         Pelvis (Tilt)  Anterior x Neutral  Posterior   Iliac Crests  Right Elevated x Neutral  Left Elevated   Knee - Frontal   Genuvalgum x Neutral  Genuvarum   Knee - Sagittal  Genurecurvatum x Neutral     Rearfoot  Valgus x Neutral  Varus   Forefoot  Abducted x Neutral     Arch  Pes Planus x Neutral  Pes Cavus       NEURO Screen  Reflexes  Right Left   Biceps (C5-C6) 2 2   Triceps (C7-C8) 2+ 2+   Patellar (L3-L4) 3 3   Achilles (S1-S2) 2 2       LE Myotomes:  Nerve root Test Action RIGHT  LEFT   L1-L2 Hip Flexion WNL WNL   L3 Knee Extension WNL WNL   L4  Ankle DF WNL WNL   L5 Great toe Extension WNL WNL   S1 Ankle PF, ankle eversion, hip extension, knee flexion WNL WNL   S2 Knee Flexion WNL WNL       ROM: Joint / Motion  Right  Left    Lumbar Flexion  WNL     Lumbar Extension  WNL     Lumbar Sidebending  WNL  WNL    Lumbar rotation WNL  WNL      Repeated Movements:    Standing Baseline: 2/10 pain      DURING MVMT    RESPONSE AFTER  Standing Flexion NT NT   Standing Extension No change in pain No change in pain       Additional Assessments:  Pain with palpation noted: TTP at L4/5  Joint Mobility: L5 sacralization     Special Tests:  Lumbar Specific and Neural Tension                                                                            Test / Measure  Right 10/27/2022 Left 10/27/2022   Straight Leg raise` N Pre PNF: 45*  Post PNF: 85*   Crossed straight leg raise P N   Slump test N P   Prone instability test P P           Pertinent Findings and Outcome Measures:                                                                                                                                                                         Test / Measure  10/27/2022      FOTO (predicted 74) 65      SLR 45*      Crossed SLR P        Precautions: none      Manuals 10/27                                                                Neuro Re-Ed             Bridges              Supermans              Reverse hypers             SLR with stabilization                                                    Ther Ex             HEP review 5'            Goblet squat             DLs                                                                              Ther Activity                                       Gait Training                                       Modalities

## 2022-11-01 ENCOUNTER — OFFICE VISIT (OUTPATIENT)
Dept: PHYSICAL THERAPY | Facility: REHABILITATION | Age: 22
End: 2022-11-01

## 2022-11-01 DIAGNOSIS — M54.41 CHRONIC BILATERAL LOW BACK PAIN WITH BILATERAL SCIATICA: ICD-10-CM

## 2022-11-01 DIAGNOSIS — M54.16 LUMBAR RADICULITIS: Primary | ICD-10-CM

## 2022-11-01 DIAGNOSIS — G89.29 CHRONIC BILATERAL LOW BACK PAIN WITH BILATERAL SCIATICA: ICD-10-CM

## 2022-11-01 DIAGNOSIS — M54.42 CHRONIC BILATERAL LOW BACK PAIN WITH BILATERAL SCIATICA: ICD-10-CM

## 2022-11-01 NOTE — PROGRESS NOTES
Daily Note     Today's date: 2022  Patient name: Usman Sutherland  : 2000  MRN: [id number]  Referring provider: Evelynn Ganser, DO  Dx:   Encounter Diagnosis     ICD-10-CM    1  Lumbar radiculitis  M54 16    2  Chronic bilateral low back pain with bilateral sciatica  M54 42     M54 41     G89 29        Start Time: 1315  Stop Time: 1400  Total time in clinic (min): 45 minutes    Subjective: "The bridges hurt my low back, and the supermans are hard "       Objective: See treatment diary below      Assessment: Tolerated treatment and increased resistance for goblet squat and DL well without complaint of increased pain  Patient would benefit from continued PT  1:1 with Matheus Regalado DPT for entirety of tx  Plan: Continue per plan of care        Pertinent Findings and Outcome Measures:                                                                                                                                                                         Test / Measure  10/27/2022      FOTO (predicted 74) 65      SLR 45*      Crossed SLR P        Precautions: none      Manuals 10/27 11/1                                                               Neuro Re-Ed             Bridges   2x10           Supermans   5"x10           Reverse hypers  SL 2x10           SLR with stabilization  10 b/l           Bird dogs  20x           Prone hip ext                          Ther Ex             HEP review 5'            Goblet squat  3x10 15#           DLs  3x5 15#           Bike  10'                                                                Ther Activity                                       Gait Training                                       Modalities

## 2022-11-03 ENCOUNTER — OFFICE VISIT (OUTPATIENT)
Dept: PHYSICAL THERAPY | Facility: REHABILITATION | Age: 22
End: 2022-11-03

## 2022-11-03 DIAGNOSIS — G89.29 CHRONIC BILATERAL LOW BACK PAIN WITH BILATERAL SCIATICA: ICD-10-CM

## 2022-11-03 DIAGNOSIS — M54.41 CHRONIC BILATERAL LOW BACK PAIN WITH BILATERAL SCIATICA: ICD-10-CM

## 2022-11-03 DIAGNOSIS — M54.16 LUMBAR RADICULITIS: Primary | ICD-10-CM

## 2022-11-03 DIAGNOSIS — M54.42 CHRONIC BILATERAL LOW BACK PAIN WITH BILATERAL SCIATICA: ICD-10-CM

## 2022-11-03 NOTE — PROGRESS NOTES
Daily Note     Today's date: 11/3/2022  Patient name: Aubrey Paulino  : 2000  MRN: [id number]  Referring provider: Jv Jasso DO  Dx:   Encounter Diagnosis     ICD-10-CM    1  Lumbar radiculitis  M54 16    2  Chronic bilateral low back pain with bilateral sciatica  M54 42     M54 41     G89 29        Start Time: 1345  Stop Time: 1430  Total time in clinic (min): 45 minutes    Subjective: Improved tolerance of exercises       Objective: See treatment diary below      Assessment: Tolerated treatment well  Patient would benefit from continued PT 1:1 with Devorah Cason DPT for 30mins of tx  Pt progressing very well, improved exercise and loading tolerance  Provided tactile cues with chair tap for improved wt shift and reduce anterior translation with squat  Plan: Continue per plan of care        Pertinent Findings and Outcome Measures:                                                                                                                                                                         Test / Measure  10/27/2022   FOTO (predicted 74) 65   SLR 45*   Crossed SLR P     Precautions: none      Manuals 10/27 11/1 11/3                           Neuro Re-Ed      Bridges   2x10 3x10 15#   Supermans   5"x10 5"x10   Reverse hypers  SL 2x10 3x10   SLR with stabilization  10 b/l 2x10 (B)   Carlitos Curl   2x10 8#   Ther Ex      HEP review 5'     Goblet squat  3x10 15# 3x10 15#   DLs  3x5 15# 4x5 15#   Bike  10'  10'                            Ther Activity                  Gait Training                  Modalities

## 2022-11-08 ENCOUNTER — OFFICE VISIT (OUTPATIENT)
Dept: PHYSICAL THERAPY | Facility: REHABILITATION | Age: 22
End: 2022-11-08

## 2022-11-08 DIAGNOSIS — M54.42 CHRONIC BILATERAL LOW BACK PAIN WITH BILATERAL SCIATICA: ICD-10-CM

## 2022-11-08 DIAGNOSIS — M54.16 LUMBAR RADICULITIS: Primary | ICD-10-CM

## 2022-11-08 DIAGNOSIS — G89.29 CHRONIC BILATERAL LOW BACK PAIN WITH BILATERAL SCIATICA: ICD-10-CM

## 2022-11-08 DIAGNOSIS — M54.41 CHRONIC BILATERAL LOW BACK PAIN WITH BILATERAL SCIATICA: ICD-10-CM

## 2022-11-08 NOTE — PROGRESS NOTES
Daily Note     Today's date: 2022  Patient name: Jolly Golden  : 2000  MRN: [id number]  Referring provider: Tyesha Doll DO  Dx:   Encounter Diagnosis     ICD-10-CM    1  Lumbar radiculitis  M54 16    2  Chronic bilateral low back pain with bilateral sciatica  M54 42     M54 41     G89 29        Start Time: 1105  Stop Time: 1150  Total time in clinic (min): 45 minutes    Subjective: Pt reports 7/10 R hip and LBP prior to starting treatment that may exacerbated due to pilates workout yesterday  "I feel like I don't have enough blood flow to my R leg"      Objective: See treatment diary below      Assessment: Tolerated treatment well  Pt experienced reduction of R hip and R LB sx with piriformis stretch and contract/relax exercise  Patient would benefit from continued PT 1:1 with ARTEM Corrigan under direct supervision of Benny Hernandez DPT for entirety of tx  Plan: Continue per plan of care  Progress treatment as tolerated  Continue to introduce glute strengthening exercises, as tolerated       Pertinent Findings and Outcome Measures:                                                                                                                                                                         Test / Measure  10/27/2022   FOTO (predicted 74) 65   SLR 45*   Crossed SLR P     Precautions: none      Manuals 10/27 11/1 11/3 11/8   theragun    R piriformis 8'   Neuro Re-Ed       Bridges   2x10 3x10 15# 3x10 15#   Supermans   5"x10 5"x10    Reverse hypers    SL 2x10 3x10 3x10   SLR with stabilization  10 b/l 2x10 (B)    Carlitos Curl   2x10 8#    Ther Ex       HEP review 5'      Goblet squat  3x10 15# 3x10 15#    DLs  3x5 15# 4x5 15# 5x5 15#   Bike  10'  10'  10'    SL abd    3x10 ea   Donkey kicks                     Ther Activity                     Gait Training                     Modalities

## 2022-11-10 ENCOUNTER — OFFICE VISIT (OUTPATIENT)
Dept: PHYSICAL THERAPY | Facility: REHABILITATION | Age: 22
End: 2022-11-10

## 2022-11-10 DIAGNOSIS — M54.41 CHRONIC BILATERAL LOW BACK PAIN WITH BILATERAL SCIATICA: ICD-10-CM

## 2022-11-10 DIAGNOSIS — M54.16 LUMBAR RADICULITIS: Primary | ICD-10-CM

## 2022-11-10 DIAGNOSIS — M54.42 CHRONIC BILATERAL LOW BACK PAIN WITH BILATERAL SCIATICA: ICD-10-CM

## 2022-11-10 DIAGNOSIS — G89.29 CHRONIC BILATERAL LOW BACK PAIN WITH BILATERAL SCIATICA: ICD-10-CM

## 2022-11-10 NOTE — PROGRESS NOTES
Daily Note     Today's date: 11/10/2022  Patient name: Jagjit Waldrop  : 2000  MRN: [id number]  Referring provider: Kim Schulte DO  Dx:   Encounter Diagnosis     ICD-10-CM    1  Lumbar radiculitis  M54 16    2  Chronic bilateral low back pain with bilateral sciatica  M54 42     M54 41     G89 29                   Subjective: Pt states she felt better after the theragun last session  Objective: See treatment diary below      Assessment: Tolerated treatment well as she was able to increase weight with DL and squats today  Pt required VC with SL ABD to decrease hip rotation and imprve glut activation     Patient would benefit from continued PT         Plan: Continue per plan of care  Progress treatment as tolerated  Continue to introduce glute strengthening exercises, as tolerated       Pertinent Findings and Outcome Measures:                                                                                                                                                                         Test / Measure  10/27/2022   FOTO (predicted 74) 65   SLR 45*   Crossed SLR P     Precautions: none      Manuals 10/27 11/1 11/3 11/8 11/10    theragun    R piriformis 8' B/L piriformis 8'    Neuro Re-Ed        Bridges   2x10 3x10 15# 3x10 15# 15# 3 x 10    Supermans   5"x10 5"x10     Reverse hypers    SL 2x10 3x10 3x10 3 x 10    SLR with stabilization  10 b/l 2x10 (B)     Carlitos Curl   2x10 8#     Ther Ex        HEP review 5'       Goblet squat  3x10 15# 3x10 15#  20# 5 x 5    DLs  3x5 15# 4x5 15# 5x5 15# 20# 5 x 5    Bike  10'  10'  10'  10 min   SL abd    3x10 ea 3 x 10    Donkey kicks                        Ther Activity                        Gait Training                        Modalities

## 2022-11-15 ENCOUNTER — OFFICE VISIT (OUTPATIENT)
Dept: PHYSICAL THERAPY | Facility: REHABILITATION | Age: 22
End: 2022-11-15

## 2022-11-15 DIAGNOSIS — M54.42 CHRONIC BILATERAL LOW BACK PAIN WITH BILATERAL SCIATICA: ICD-10-CM

## 2022-11-15 DIAGNOSIS — G89.29 CHRONIC BILATERAL LOW BACK PAIN WITH BILATERAL SCIATICA: ICD-10-CM

## 2022-11-15 DIAGNOSIS — M54.16 LUMBAR RADICULITIS: Primary | ICD-10-CM

## 2022-11-15 DIAGNOSIS — M54.41 CHRONIC BILATERAL LOW BACK PAIN WITH BILATERAL SCIATICA: ICD-10-CM

## 2022-11-15 NOTE — PROGRESS NOTES
Daily Note     Today's date: 11/15/2022  Patient name: Marva Roberson  : 2000  MRN: [id number]  Referring provider: Hailey Morley DO  Dx:   Encounter Diagnosis     ICD-10-CM    1  Lumbar radiculitis  M54 16    2  Chronic bilateral low back pain with bilateral sciatica  M54 42     M54 41     G89 29        Start Time: 1345  Stop Time: 1435  Total time in clinic (min): 50 minutes    Subjective: Pt reports 4/10 R LBP into her R glute and lateral thigh  Pt reports exacerbation over the weekend after vacuuming  Objective: See treatment diary below      Assessment: Tolerated treatment and addition of Carlitos curls with prolonged stretch at exacerbated positions well with minor increase in LBP but no peripheralization of sx  Patient would benefit from continued PT 1:1 with ARTEM Gomes under direct supervision of Benedicto Jaime DPT for entirety of tx  Plan: Continue per plan of care  Progress treatment as tolerated         Pertinent Findings and Outcome Measures:                                                                                                                                                                         Test / Measure  10/27/2022 11/15   FOTO (predicted 74) 65 71   SLR 45*    Crossed SLR P      Precautions: none      Manuals 10/27 11/1 11/3 11/8 11/10  11/15   theragun    R piriformis 8' B/L piriformis 8'     Neuro Re-Ed         Bridges   2x10 3x10 15# 3x10 15# 15# 3 x 10  3x10 15#   Supermans   5"x10 5"x10      Reverse hypers    SL 2x10 3x10 3x10 3 x 10  3x10   SLR with stabilization  10 b/l 2x10 (B)      Carlitos Curl   2x10 8#   2x5 with 5" pause during ascent   Ther Ex         HEP review 5'        Goblet squat  3x10 15# 3x10 15#  20# 5 x 5  20# 5 x 5   DLs  3x5 15# 4x5 15# 5x5 15# 20# 5 x 5  20# 5 x 5    Bike  10'  10'  10'  10 min 10'    SL abd    3x10 ea 3 x 10  3x10 ea 0 5#   [employer]                           Ther Activity Gait Training                           Modalities

## 2022-11-17 ENCOUNTER — OFFICE VISIT (OUTPATIENT)
Dept: PHYSICAL THERAPY | Facility: REHABILITATION | Age: 22
End: 2022-11-17

## 2022-11-17 DIAGNOSIS — M54.41 CHRONIC BILATERAL LOW BACK PAIN WITH BILATERAL SCIATICA: ICD-10-CM

## 2022-11-17 DIAGNOSIS — G89.29 CHRONIC BILATERAL LOW BACK PAIN WITH BILATERAL SCIATICA: ICD-10-CM

## 2022-11-17 DIAGNOSIS — M54.16 LUMBAR RADICULITIS: Primary | ICD-10-CM

## 2022-11-17 DIAGNOSIS — M54.42 CHRONIC BILATERAL LOW BACK PAIN WITH BILATERAL SCIATICA: ICD-10-CM

## 2022-11-17 NOTE — PROGRESS NOTES
Daily Note     Today's date: 2022  Patient name: Robert Mcneal  : 2000  MRN: [id number]  Referring provider: Bonnie Eugene DO  Dx:   Encounter Diagnosis     ICD-10-CM    1  Lumbar radiculitis  M54 16       2  Chronic bilateral low back pain with bilateral sciatica  M54 42     M54 41     G89 29           Start Time: 1345  Stop Time: 1430  Total time in clinic (min): 45 minutes    Subjective: Pt reports increased pain last night following difficulty sleeping and washing dishes  Objective: See treatment diary below      Assessment: Tolerated treatment well  Patient would benefit from continued PT 1:1 with BRIGID ParkerT for entirety of tx  Added nerve glides to HEP, tolerated well  Plan: Continue per plan of care        Pertinent Findings and Outcome Measures:                                                                                                                                                                         Test / Measure  10/27/2022 11/15   FOTO (predicted 74) 65 71   SLR 45*    Crossed SLR P      Precautions: none      Manuals 10/27 11/1 11/3 11/8 11/10  11/15 11/17   theragun    R piriformis 8' B/L piriformis 8'      Lumbar Rot G5 mobs       KS (B)   Neuro Re-Ed          Bridges   2x10 3x10 15# 3x10 15# 15# 3 x 10  3x10 15# 3x10 15#   Supermans   5"x10 5"x10       Reverse hypers    SL 2x10 3x10 3x10 3 x 10  3x10 3x10  2x10 swivel   SLR with stabilization  10 b/l 2x10 (B)       Carlitos Curl   2x10 8#   2x5 with 5" pause during ascent 2x5 with 5" pause during ascent   Slump Mobs       3x10   9090 Nerve glides       3x10                       Ther Ex          HEP review 5'         Goblet squat  3x10 15# 3x10 15#  20# 5 x 5  20# 5 x 5    DLs  3x5 15# 4x5 15# 5x5 15# 20# 5 x 5  20# 5 x 5  25# 3 x 10   Bike  10'  10'  10'  10 min 10'  10'    SL abd    3x10 ea 3 x 10  3x10 ea 0 5# 3x10    [employer]                              Ther Activity Gait Training                              Modalities

## 2022-11-22 ENCOUNTER — OFFICE VISIT (OUTPATIENT)
Dept: PHYSICAL THERAPY | Facility: REHABILITATION | Age: 22
End: 2022-11-22

## 2022-11-22 DIAGNOSIS — M54.16 LUMBAR RADICULITIS: Primary | ICD-10-CM

## 2022-11-22 DIAGNOSIS — M54.42 CHRONIC BILATERAL LOW BACK PAIN WITH BILATERAL SCIATICA: ICD-10-CM

## 2022-11-22 DIAGNOSIS — G89.29 CHRONIC BILATERAL LOW BACK PAIN WITH BILATERAL SCIATICA: ICD-10-CM

## 2022-11-22 DIAGNOSIS — M54.41 CHRONIC BILATERAL LOW BACK PAIN WITH BILATERAL SCIATICA: ICD-10-CM

## 2022-11-22 NOTE — PROGRESS NOTES
Daily Note     Today's date: 2022  Patient name: Garima Sainz  : 2000  MRN: [id number]  Referring provider: Troy Molina DO  Dx:   Encounter Diagnosis     ICD-10-CM    1  Lumbar radiculitis  M54 16       2  Chronic bilateral low back pain with bilateral sciatica  M54 42     M54 41     G89 29           Start Time: 1120  Stop Time: 1207  Total time in clinic (min): 47 minutes    Subjective: Pt reports mild LBP but no peripheralization into LEs today  Objective: See treatment diary below      Assessment: Tolerated treatment and progression to bridges on ball well with no reported increase in pain  Patient would benefit from continued PT to continue to centralize pain and increase muscular endurance  1:1 with Vladislav Mcmanus, under direct supervision of Fer Irizarry DPT for entirety of tx  Plan: Continue per plan of care  Progress treatment as tolerated         Pertinent Findings and Outcome Measures:                                                                                                                                                                         Test / Measure  10/27/2022 11/15   FOTO (predicted 74) 65 71   SLR 45*    Crossed SLR P      Precautions: none      Manuals 10/27 11/1 11/3 11/8 11/10  11/15 11/17 11/22   theragun    R piriformis 8' B/L piriformis 8'       Lumbar Rot G5 mobs       KS (B)    Neuro Re-Ed           Bridges   2x10 3x10 15# 3x10 15# 15# 3 x 10  3x10 15# 3x10 15# 3x10 on ball   Supermans   5"x10 5"x10        Reverse hypers    SL 2x10 3x10 3x10 3 x 10  3x10 3x10  2x10 swivel 2x10  2x10 swivel   SLR with stabilization  10 b/l 2x10 (B)        Carlitos Curl   2x10 8#   2x5 with 5" pause during ascent 2x5 with 5" pause during ascent 3x10 with 5" pause during ascent   Slump Mobs       3x10    9090 Nerve glides       3x10                          Ther Ex           HEP review 5'          Goblet squat  3x10 15# 3x10 15#  20# 5 x 5  20# 5 x 5     DLs 3x5 15# 4x5 15# 5x5 15# 20# 5 x 5  20# 5 x 5  25# 3 x 10 25# 3 x 10   Bike  10'  10'  10'  10 min 10'  10'  10'    SL abd    3x10 ea 3 x 10  3x10 ea 1# 3x10     [employer]                                 Ther Activity                                 Gait Training                                 Modalities

## 2022-11-29 ENCOUNTER — APPOINTMENT (OUTPATIENT)
Dept: PHYSICAL THERAPY | Facility: REHABILITATION | Age: 22
End: 2022-11-29

## 2022-12-01 ENCOUNTER — APPOINTMENT (OUTPATIENT)
Dept: PHYSICAL THERAPY | Facility: REHABILITATION | Age: 22
End: 2022-12-01

## 2022-12-07 ENCOUNTER — OFFICE VISIT (OUTPATIENT)
Dept: FAMILY MEDICINE CLINIC | Facility: CLINIC | Age: 22
End: 2022-12-07

## 2022-12-07 VITALS
HEIGHT: 67 IN | SYSTOLIC BLOOD PRESSURE: 116 MMHG | HEART RATE: 83 BPM | BODY MASS INDEX: 21.19 KG/M2 | OXYGEN SATURATION: 99 % | WEIGHT: 135 LBS | DIASTOLIC BLOOD PRESSURE: 84 MMHG | TEMPERATURE: 97.1 F

## 2022-12-07 DIAGNOSIS — R05.1 ACUTE COUGH: ICD-10-CM

## 2022-12-07 DIAGNOSIS — H69.83 DYSFUNCTION OF BOTH EUSTACHIAN TUBES: Primary | ICD-10-CM

## 2022-12-07 DIAGNOSIS — R09.81 NASAL CONGESTION: ICD-10-CM

## 2022-12-07 RX ORDER — BENZONATATE 100 MG/1
100 CAPSULE ORAL 3 TIMES DAILY PRN
Qty: 20 CAPSULE | Refills: 0 | Status: SHIPPED | OUTPATIENT
Start: 2022-12-07

## 2022-12-07 RX ORDER — FLUTICASONE PROPIONATE 50 MCG
2 SPRAY, SUSPENSION (ML) NASAL DAILY
Qty: 16 G | Refills: 0 | Status: SHIPPED | OUTPATIENT
Start: 2022-12-07 | End: 2022-12-12 | Stop reason: SDUPTHER

## 2022-12-07 NOTE — PATIENT INSTRUCTIONS
Start Flonase, this is an intranasal corticosteroid  This is 2 sprays each nostril once daily  Please be aware that this can cause nasal mucosa irritation  Stop using if you experience any nose bleeds  This can be used for allergy symptoms as well as ear discomfort/pressure  Start cough medication, this is the Tessalon perles  One pill every 8 hours as needed for cough  Please call the office if you are experiencing any worsening of symptoms or no symptom improvement

## 2022-12-07 NOTE — PROGRESS NOTES
Assessment/Plan:   Diagnosis ICD-10-CM Associated Orders   1  Dysfunction of both eustachian tubes  H69 83 fluticasone (FLONASE) 50 mcg/act nasal spray      2  Nasal congestion  R09 81 fluticasone (FLONASE) 50 mcg/act nasal spray      3  Acute cough  R05 1 benzonatate (TESSALON PERLES) 100 mg capsule        Improvement in symptoms at this time  If no improvement or any worsening symptoms over next few days, I would recommend treating as bacterial sinusitis due to time frame of symptoms  At this time for symptom management she will start flonase and tessalon perles  Advised to call with update next week  If ear pressure persists, may benefit from steroid course prior to trip  Advised to call the office for any worsening of symptoms or no symptom improvement  Patient verbalizes understand and agrees with treatment plan  Diagnoses and all orders for this visit:    Dysfunction of both eustachian tubes  -     fluticasone (FLONASE) 50 mcg/act nasal spray; 2 sprays into each nostril daily    Nasal congestion  -     fluticasone (FLONASE) 50 mcg/act nasal spray; 2 sprays into each nostril daily    Acute cough  -     benzonatate (TESSALON PERLES) 100 mg capsule; Take 1 capsule (100 mg total) by mouth 3 (three) times a day as needed for cough              Subjective:        Patient ID: Brayden Wiseman is a 20 y o  female  Chief Complaint   Patient presents with   • Cold Like Symptoms     Sore throat,congestion x 10 days, thought it could have been an allergic reaction initially due to throat being so sore throat x a few days and then the congestion started and feels it more at night  Has left ear pain and head pain that comes and goes neg covid home test  stated she is feeling better today, she is going away and wants to make sure she is ok to scuba dive        Patient presents with:cold symptoms   She has had Sore throat,congestion x 10 days, thought it could have been an allergic reaction initially due to throat being so sore throat x a few days and then the congestion started and feels it more at night  Has left ear pain and head pain that comes and goes neg covid home test  stated she is feeling better today, she is going away and wants to make sure she is ok to scuba dive  Right now sore throat has resolved  Congestion and ear pain present but a lot better than it was  Overall today she is feeling better  Cough does keep her up at night  The following portions of the patient's history were reviewed and updated as appropriate: allergies, current medications, past family history, past social history and problem list     Review of Systems   Constitutional: Negative for chills and fever  HENT: Positive for congestion, ear pain and postnasal drip  Negative for sinus pressure (improved), sinus pain (improved) and sore throat  Eyes: Negative for discharge  Respiratory: Positive for cough  Negative for shortness of breath  Cardiovascular: Negative for chest pain  Gastrointestinal: Negative for constipation and diarrhea  Genitourinary: Negative for difficulty urinating  Musculoskeletal: Negative for joint swelling  Skin: Negative for rash  Neurological: Negative for headaches  Hematological: Negative for adenopathy  Psychiatric/Behavioral: The patient is not nervous/anxious  Objective:  /84 (BP Location: Left arm, Patient Position: Sitting, Cuff Size: Adult)   Pulse 83   Temp (!) 97 1 °F (36 2 °C) (Temporal)   Ht 5' 7" (1 702 m)   Wt 61 2 kg (135 lb)   LMP 11/24/2022 (Exact Date)   SpO2 99%   BMI 21 14 kg/m²      Physical Exam  Vitals and nursing note reviewed  Constitutional:       General: She is not in acute distress  Appearance: She is well-developed  She is not diaphoretic  HENT:      Head: Normocephalic and atraumatic  Right Ear: External ear normal  No tenderness  A middle ear effusion is present  There is no impacted cerumen  Tympanic membrane is retracted  Tympanic membrane is not scarred, perforated or erythematous  Left Ear: External ear normal  No tenderness  No middle ear effusion  There is no impacted cerumen  Tympanic membrane is not scarred, perforated, erythematous or retracted  Nose:      Right Sinus: No maxillary sinus tenderness or frontal sinus tenderness  Left Sinus: No maxillary sinus tenderness or frontal sinus tenderness  Mouth/Throat:      Pharynx: Oropharynx is clear  Uvula midline  No pharyngeal swelling, oropharyngeal exudate or posterior oropharyngeal erythema  Eyes:      General: Lids are normal          Right eye: No discharge  Left eye: No discharge  Conjunctiva/sclera: Conjunctivae normal    Cardiovascular:      Rate and Rhythm: Normal rate and regular rhythm  Heart sounds: No murmur heard  Pulmonary:      Effort: Pulmonary effort is normal  No respiratory distress  Breath sounds: Normal breath sounds  No wheezing  Musculoskeletal:         General: No deformity  Cervical back: Neck supple  Skin:     General: Skin is warm and dry  Neurological:      Mental Status: She is alert and oriented to person, place, and time  Psychiatric:         Speech: Speech normal          Behavior: Behavior normal          Thought Content:  Thought content normal          Judgment: Judgment normal                 Current Outpatient Medications:   •  Albuterol Sulfate 108 (90 Base) MCG/ACT AEPB, Inhale 2 puffs every 6 (six) hours as needed (wheezing/ shortness of breath/ prior to exercise), Disp: 1 each, Rfl: 1  •  benzonatate (TESSALON PERLES) 100 mg capsule, Take 1 capsule (100 mg total) by mouth 3 (three) times a day as needed for cough, Disp: 20 capsule, Rfl: 0  •  drospirenone-ethinyl estradiol (RAQUEL) 3-0 02 MG per tablet, TAKE 1 TABLET DAILY, Disp: 84 tablet, Rfl: 3  •  escitalopram (LEXAPRO) 5 mg tablet, Take 1 tablet (5 mg total) by mouth daily, Disp: 30 tablet, Rfl: 5  •  fluticasone (FLONASE) 50 mcg/act nasal spray, 2 sprays into each nostril daily, Disp: 16 g, Rfl: 0  •  LORazepam (Ativan) 0 5 mg tablet, Take 1 tablet (0 5 mg total) by mouth 2 (two) times a day as needed for anxiety, Disp: 30 tablet, Rfl: 0  •  Tazorac 0 05 % cream, APPLY TO THE FACE 2 TO 3 NIGHTS A WEEK AFTER MOISTURIZER, Disp: , Rfl:   •  gabapentin (NEURONTIN) 300 mg capsule, Take 1 capsule (300 mg total) by mouth daily at bedtime (Patient not taking: Reported on 12/7/2022), Disp: 30 capsule, Rfl: 2  •  tretinoin (ALTRALIN) 0 05 %, APPLY PEA SIZED AMOUNT TO ENTIRE FACE EVERY NIGHT AT BEDTIME 1 HOUR AFTER WASHING FACE (Patient not taking: Reported on 8/25/2022), Disp: , Rfl:   No Known Allergies

## 2022-12-12 DIAGNOSIS — R09.81 NASAL CONGESTION: ICD-10-CM

## 2022-12-12 DIAGNOSIS — H69.83 DYSFUNCTION OF BOTH EUSTACHIAN TUBES: ICD-10-CM

## 2022-12-12 RX ORDER — FLUTICASONE PROPIONATE 50 MCG
2 SPRAY, SUSPENSION (ML) NASAL DAILY
Qty: 48 G | Refills: 0 | Status: SHIPPED | OUTPATIENT
Start: 2022-12-12

## 2022-12-13 ENCOUNTER — OFFICE VISIT (OUTPATIENT)
Dept: PHYSICAL THERAPY | Facility: REHABILITATION | Age: 22
End: 2022-12-13

## 2022-12-13 DIAGNOSIS — M54.41 CHRONIC BILATERAL LOW BACK PAIN WITH BILATERAL SCIATICA: ICD-10-CM

## 2022-12-13 DIAGNOSIS — G89.29 CHRONIC BILATERAL LOW BACK PAIN WITH BILATERAL SCIATICA: ICD-10-CM

## 2022-12-13 DIAGNOSIS — M54.42 CHRONIC BILATERAL LOW BACK PAIN WITH BILATERAL SCIATICA: ICD-10-CM

## 2022-12-13 DIAGNOSIS — M54.16 LUMBAR RADICULITIS: Primary | ICD-10-CM

## 2022-12-13 NOTE — PROGRESS NOTES
Daily Note     Today's date: 2022  Patient name: Cheryl Jaime  : 2000  MRN: [id number]  Referring provider: Jorge L Chavez DO  Dx:   Encounter Diagnosis     ICD-10-CM    1  Lumbar radiculitis  M54 16       2  Chronic bilateral low back pain with bilateral sciatica  M54 42     M54 41     G89 29           Start Time: 1115  Stop Time: 1200  Total time in clinic (min): 45 minutes    Subjective: Pt reports missing appts due to illness, poor HEP compliance due to illness  Objective: See treatment diary below      Assessment: Tolerated treatment well  Patient would benefit from continued PT 1:1 with Yaima Carlton DPT for entirety of tx  Pt demontrates similar load tolerance as prior visit despite small lapse in care related to illness and holiday  Revaluation next visit  Plan: Continue per plan of care        Pertinent Findings and Outcome Measures:                                                                                                                                                                         Test / Measure  10/27/2022 11/15   FOTO (predicted 74) 65 71   SLR 45*    Crossed SLR P      Precautions: none      Manuals 11/15 11/17 11/22 12/13   theragun       Lumbar Rot G5 mobs  KS (B)     Neuro Re-Ed       Bridges  3x10 15# 3x10 15# 3x10 on ball 3x10 on ball   Supermans        Reverse hypers   3x10 3x10  2x10 swivel 2x10  2x10 swivel 3x10   SLR with stabilization       Carlitos Curl 2x5 with 5" pause during ascent 2x5 with 5" pause during ascent 3x10 with 5" pause during ascent 3x10 with 5" pause during ascent   Slump Mobs  3x10     9090 Nerve glides  3x10                   Ther Ex       HEP review       Goblet squat 20# 5 x 5      DLs 20# 5 x 5  25# 3 x 10 25# 3 x 10 25# 3 x 10   Bike 10'  10'  10'  6'   SL abd 3x10 ea 1# 3x10      [employer]                     Ther Activity                     Gait Training                     Modalities

## 2022-12-15 ENCOUNTER — APPOINTMENT (OUTPATIENT)
Dept: PHYSICAL THERAPY | Facility: REHABILITATION | Age: 22
End: 2022-12-15

## 2022-12-16 ENCOUNTER — TELEMEDICINE (OUTPATIENT)
Dept: FAMILY MEDICINE CLINIC | Facility: CLINIC | Age: 22
End: 2022-12-16

## 2022-12-16 DIAGNOSIS — U07.1 COVID-19 VIRUS INFECTION: Primary | ICD-10-CM

## 2022-12-16 NOTE — PROGRESS NOTES
COVID-19 Outpatient Progress Note    Assessment/Plan:    Problem List Items Addressed This Visit    None  Visit Diagnoses     COVID-19 virus infection    -  Primary         Disposition:     Patient has asymptomatic or mild COVID-19 infection  Based off CDC guidelines, they were recommended to isolate for 5 days  If they are asymptomatic or symptoms are improving with no fevers in the past 24 hours, isolation may be ended followed by 5 days of wearing a mask when around othes to minimize risk of infecting others  If still have a fever or other symptoms have not improved, continue to isolate until they improve  Regardless of when they end isolation, avoid being around people who are more likely to get very sick from COVID-19 until at least day 11  Discussed symptom directed medication options with patient  Discussed vitamin D, vitamin C, and/or zinc supplementation with patient  Recommended regimen for fevers:   1,000 mg acetaminophen TID and 400 mg advil BID in between doses  Take with food  Discussed vitamin c, vitamin d, zinc  Can use decongestant PRN  Stay well hydrated  Rest  Isolation guidelines reviewed with patient  Advised to hold flonase due to mucosal irritation and use saline nasal spray/humidified air  Please call the office if you are experiencing any worsening of symptoms or no symptom improvement  Advised on red flag symptoms and when to call (respiratory symptoms/ fevers not responding to medication)    I have spent 18 minutes directly with the patient  Greater than 50% of this time was spent in counseling/coordination of care regarding: risks and benefits of treatment options, instructions for management, patient and family education and importance of treatment compliance         Encounter provider: AALIYAH Beatty     Provider located at: [address] [place] [address] PRIMARY CARE  [address]  [place]  [address] [id number] [phone] Recent Visits  No visits were found meeting these conditions  Showing recent visits within past 7 days and meeting all other requirements  Today's Visits  Date Type Provider Dept   12/16/22 Telemedicine Ruthie [place], [address] Primary Care   Showing today's visits and meeting all other requirements  Future Appointments  No visits were found meeting these conditions  Showing future appointments within next 150 days and meeting all other requirements     This virtual check-in was done via [employer] and patient was informed that this is a secure, HIPAA-compliant platform  She agrees to proceed  Patient agrees to participate in a virtual check in via telephone or video visit instead of presenting to the office to address urgent/immediate medical needs  Patient is aware this is a billable service  She acknowledged consent and understanding of privacy and security of the video platform  The patient has agreed to participate and understands they can discontinue the visit at any time  After connecting through [facility], the patient was identified by name and date of birth  Salud King was informed that this was a telemedicine visit and that the exam was being conducted confidentially over secure lines  My office door was closed  No one else was in the room  Salud King acknowledged consent and understanding of privacy and security of the telemedicine visit  I informed the patient that I have reviewed her record in Epic and presented the opportunity for her to ask any questions regarding the visit today  The patient agreed to participate  Verification of patient location:  Patient is located in the following state in which I hold an active license: [place]    Subjective:   Salud King is a 20 y o  female who has been screened for COVID-19  Symptom change since last report: unchanged   Patient's symptoms include fever, chills, fatigue, malaise, nasal congestion, rhinorrhea, cough (rare), nausea, myalgias and headache  Patient denies sore throat, anosmia, loss of taste, shortness of breath, chest tightness, abdominal pain, vomiting and diarrhea  - Date of symptom onset: 12/14/2022  - Date of positive COVID-19 test: 12/15/2022  Type of test: Home antigen  Patient with typical symptoms of COVID-19 and they attest that they were positive on home rapid antigen testing  Image of positive result is not able to be uploaded into their chart  COVID-19 vaccination status: Not vaccinated    Laurie Chang has been staying home and has isolated themselves in her home  She is taking care to not share personal items and is cleaning all surfaces that are touched often, like counters, tabletops, and doorknobs using household cleaning sprays or wipes  She is wearing a mask when she leaves her room  She was feeling fine doing the Flonase from last visit  Wednesday her and her friend took a day trip to [place] and she had a fever that night T max 103 5 and she started feeling very flu like  Yesterday morning she sent a message worried it was related to the Flonase  Last night she took 2 home COVID tests that were positive  She has been taking tylenol and advil for the fever but it's not going down that much  It's staying 101-102  She took a decongestant last night before bed  She is unsure if this helped  Has had COVID back in 2020      400 mg advil and 500 mg tylenol on first night  Then doing 1,000 mg every 6 hours  No results found for: Thedora Rodriguez, SARSCORONAVI, CORONAVIRUSR, SARSCOVAG, [facility]    Review of Systems   Constitutional: Positive for chills, fatigue and fever  HENT: Positive for congestion, rhinorrhea, sinus pressure and sinus pain  Negative for sore throat  Respiratory: Positive for cough (rare)  Negative for chest tightness and shortness of breath  Gastrointestinal: Positive for nausea  Negative for abdominal pain, diarrhea and vomiting     Musculoskeletal: Positive for myalgias  Neurological: Positive for headaches  Current Outpatient Medications on File Prior to Visit   Medication Sig   • Albuterol Sulfate 108 (90 Base) MCG/ACT AEPB Inhale 2 puffs every 6 (six) hours as needed (wheezing/ shortness of breath/ prior to exercise)   • benzonatate (TESSALON PERLES) 100 mg capsule Take 1 capsule (100 mg total) by mouth 3 (three) times a day as needed for cough   • drospirenone-ethinyl estradiol (RAQUEL) 3-0 02 MG per tablet TAKE 1 TABLET DAILY   • escitalopram (LEXAPRO) 5 mg tablet Take 1 tablet (5 mg total) by mouth daily   • fluticasone (FLONASE) 50 mcg/act nasal spray 2 sprays into each nostril daily   • gabapentin (NEURONTIN) 300 mg capsule Take 1 capsule (300 mg total) by mouth daily at bedtime (Patient not taking: Reported on 12/7/2022)   • LORazepam (Ativan) 0 5 mg tablet Take 1 tablet (0 5 mg total) by mouth 2 (two) times a day as needed for anxiety   • Tazorac 0 05 % cream APPLY TO THE FACE 2 TO 3 NIGHTS A WEEK AFTER MOISTURIZER   • tretinoin (ALTRALIN) 0 05 % APPLY PEA SIZED AMOUNT TO ENTIRE FACE EVERY NIGHT AT BEDTIME 1 HOUR AFTER WASHING FACE (Patient not taking: Reported on 8/25/2022)       Objective:    [facility] 11/24/2022 (Exact Date)      Physical Exam  Constitutional:       General: She is not in acute distress  Appearance: Normal appearance  She is not ill-appearing, toxic-appearing or diaphoretic  HENT:      Head: Normocephalic and atraumatic  Nose: Nose normal    Pulmonary:      Effort: Pulmonary effort is normal  No respiratory distress  Skin:     Coloration: Skin is not pale  Neurological:      General: No focal deficit present  Mental Status: She is alert and oriented to person, place, and time     Psychiatric:         Mood and Affect: Mood normal        AALIYAH Reis

## 2022-12-19 ENCOUNTER — TELEPHONE (OUTPATIENT)
Dept: FAMILY MEDICINE CLINIC | Facility: CLINIC | Age: 22
End: 2022-12-19

## 2022-12-19 NOTE — TELEPHONE ENCOUNTER
Aristeo Burris called the office she was supposed to fly to [place] today ,but can't as she has covid  Would you approve a a letter  stating she has tested positive for Covid 19 on 12/15/22  She is not able to travel at this time per cdc guidelines  Pt had a virtual appointment with Escobar Ramírez 12/16/22     Pt's number is [phone]

## 2022-12-22 ENCOUNTER — APPOINTMENT (OUTPATIENT)
Dept: PHYSICAL THERAPY | Facility: REHABILITATION | Age: 22
End: 2022-12-22

## 2022-12-27 ENCOUNTER — TELEPHONE (OUTPATIENT)
Dept: FAMILY MEDICINE CLINIC | Facility: CLINIC | Age: 22
End: 2022-12-27

## 2022-12-27 DIAGNOSIS — F41.9 ANXIETY: ICD-10-CM

## 2022-12-27 RX ORDER — ESCITALOPRAM OXALATE 10 MG/1
TABLET ORAL
Qty: 90 TABLET | Refills: 0 | Status: SHIPPED | OUTPATIENT
Start: 2022-12-27

## 2022-12-27 RX ORDER — ESCITALOPRAM OXALATE 10 MG/1
10 TABLET ORAL DAILY
Qty: 30 TABLET | Refills: 3 | Status: SHIPPED | OUTPATIENT
Start: 2022-12-27 | End: 2022-12-27

## 2022-12-27 NOTE — TELEPHONE ENCOUNTER
I called Tong Brooke she is aware of your message and recommendations  She expressed verbal understanding and has an appointment scheduled 02/01/23 to follow up with Saleem Mireles  Call complete

## 2022-12-27 NOTE — TELEPHONE ENCOUNTER
Tracey Mora called the office in regards to she is taking lexapro 5 mg and takes 1 tablet by mouth daily  It was discussed previously that it could be increased  Pt has 2 days left of rx  She is requesting can it please be increased and new script sent? Pt uses [facility] in [place]     Pt's number is [phone]

## 2023-01-09 ENCOUNTER — OFFICE VISIT (OUTPATIENT)
Dept: PAIN MEDICINE | Facility: MEDICAL CENTER | Age: 23
End: 2023-01-09

## 2023-01-09 VITALS
SYSTOLIC BLOOD PRESSURE: 115 MMHG | WEIGHT: 136 LBS | HEART RATE: 76 BPM | HEIGHT: 67 IN | DIASTOLIC BLOOD PRESSURE: 81 MMHG | BODY MASS INDEX: 21.35 KG/M2

## 2023-01-09 DIAGNOSIS — G89.29 CHRONIC BILATERAL LOW BACK PAIN WITH BILATERAL SCIATICA: Primary | ICD-10-CM

## 2023-01-09 DIAGNOSIS — M54.41 CHRONIC BILATERAL LOW BACK PAIN WITH BILATERAL SCIATICA: Primary | ICD-10-CM

## 2023-01-09 DIAGNOSIS — M79.18 MYOFASCIAL PAIN SYNDROME: ICD-10-CM

## 2023-01-09 DIAGNOSIS — M54.42 CHRONIC BILATERAL LOW BACK PAIN WITH BILATERAL SCIATICA: Primary | ICD-10-CM

## 2023-01-09 NOTE — PROGRESS NOTES
Assessment:  1  Chronic bilateral low back pain with bilateral sciatica    2  Myofascial pain syndrome        Plan:  While the patient was in the office today, I did have a thorough conversation regarding their chronic pain syndrome, medication management, and treatment plan options  The patient has completed a course of physical therapy and does report some moderate improvement overall  She states that she would like to try to hold off on an MRI at this point as she feels that she can manage her pain  She states that she has infrequent episodes of low back pain and bilateral lower extremity numbness that she is able to manage  I did inform her to call us if her symptoms should worsen at which point we could certainly order the MRI as previously discussed  My impressions and treatment recommendations were discussed in detail with the patient who verbalized understanding and had no further questions  Discharge instructions were provided  I personally saw and examined the patient and I agree with the above discussed plan of care  No orders of the defined types were placed in this encounter  No orders of the defined types were placed in this encounter  History of Present Illness:  Altagracia Bauer is a 24 y o  female who presents for a follow up office visit in regards to Back Pain (F/u)  The patient’s current symptoms include chronic low back pain that she presently rates a 3 out of 10 on the pain scale describes it as an occasional dull, aching and shooting pain across the lumbar spine with intermittent bilateral lower extremity numbness/paresthesias  Last visit with her initial consultation with Dr Pancho Soto at which point he ordered physical therapy  The patient has completed the physical therapy course and reports moderate improvement  She discontinued the gabapentin as she did not find it very effective and also did not want to be taking medication    At this point the episodes of pain are infrequent and she does not feel that warrants any further treatment  She would like to focus on her own home exercise regimen before consideration of another MRI  I have personally reviewed and/or updated the patient's past medical history, past surgical history, family history, social history, current medications, allergies, and vital signs today  Review of Systems   Constitutional: Negative for chills and fever  HENT: Negative for ear pain and sore throat  Eyes: Negative for pain and visual disturbance  Respiratory: Negative for cough and shortness of breath  Cardiovascular: Negative for chest pain and palpitations  Gastrointestinal: Negative for abdominal pain and vomiting  Genitourinary: Negative for dysuria and hematuria  Musculoskeletal: Positive for arthralgias  Negative for back pain  Skin: Negative for color change and rash  Neurological: Negative for seizures and syncope  All other systems reviewed and are negative  Patient Active Problem List   Diagnosis   • Raynaud's disease without gangrene   • Paresthesia   • Atypical chest pain   • Palpitations   • Acne   • Exercise-induced asthma   • Chronic bilateral low back pain with bilateral sciatica   • Anxiety       Past Medical History:   Diagnosis Date   • Acne    • [facility])    • Anxiety    • Asthma        History reviewed  No pertinent surgical history      Family History   Problem Relation Age of Onset   • Breast cancer Mother         around age 48   • Migraines Maternal Grandmother    • Breast cancer Maternal Grandmother    • Prostate cancer Father    • Asthma Father    • Cancer Maternal Grandfather    • No Known Problems Maternal Aunt    • No Known Problems Maternal Aunt    • No Known Problems Paternal Aunt        Social History     Occupational History   • Not on file   Tobacco Use   • Smoking status: Never   • Smokeless tobacco: Never   Vaping Use   • Vaping Use: Never used   Substance and Sexual Activity   • Alcohol use: Yes   • Drug use: Never   • Sexual activity: Never       Current Outpatient Medications on File Prior to Visit   Medication Sig   • Albuterol Sulfate 108 (90 Base) MCG/ACT AEPB Inhale 2 puffs every 6 (six) hours as needed (wheezing/ shortness of breath/ prior to exercise)   • benzonatate (TESSALON PERLES) 100 mg capsule Take 1 capsule (100 mg total) by mouth 3 (three) times a day as needed for cough   • drospirenone-ethinyl estradiol (RAQUEL) 3-0 02 MG per tablet TAKE 1 TABLET DAILY   • escitalopram (LEXAPRO) 10 mg tablet TAKE 1 TABLET BY MOUTH DAILY   • fluticasone (FLONASE) 50 mcg/act nasal spray 2 sprays into each nostril daily   • LORazepam (Ativan) 0 5 mg tablet Take 1 tablet (0 5 mg total) by mouth 2 (two) times a day as needed for anxiety   • Tazorac 0 05 % cream APPLY TO THE FACE 2 TO 3 NIGHTS A WEEK AFTER MOISTURIZER   • escitalopram (LEXAPRO) 10 mg tablet TAKE 1 TABLET BY MOUTH DAILY   • fluticasone (FLONASE) 50 mcg/act nasal spray 2 sprays into each nostril daily   • gabapentin (NEURONTIN) 300 mg capsule Take 1 capsule (300 mg total) by mouth daily at bedtime (Patient not taking: Reported on 12/7/2022)   • tretinoin (ALTRALIN) 0 05 % APPLY PEA SIZED AMOUNT TO ENTIRE FACE EVERY NIGHT AT BEDTIME 1 HOUR AFTER WASHING FACE (Patient not taking: Reported on 8/25/2022)     No current facility-administered medications on file prior to visit  No Known Allergies    Physical Exam:    /81   Pulse 76   Ht 5' 7" (1 702 m)   Wt 61 7 kg (136 lb)   BMI 21 30 kg/m²     Constitutional:normal, well developed, well nourished, alert, in no distress and non-toxic and no overt pain behavior    Eyes:anicteric  HEENT:grossly intact  Neck:supple, symmetric, trachea midline and no masses   Pulmonary:even and unlabored  Cardiovascular:No edema or pitting edema present  Skin:Normal without rashes or lesions and well hydrated  Psychiatric:Mood and affect appropriate  Neurologic:Cranial Nerves II-XII grossly intact  Musculoskeletal:normal    Imaging

## 2023-01-25 ENCOUNTER — RA CDI HCC (OUTPATIENT)
Dept: OTHER | Facility: HOSPITAL | Age: 23
End: 2023-01-25

## 2023-01-25 NOTE — PROGRESS NOTES
Exercise-induced asthmaNoted 8/6/2015  [J45 990]      NOT on the BPA- please assess using MEAT for 2023 billing    [facility] [address]  coding opportunities          Chart Reviewed number of suggestions sent to Provider: 1     Patients Insurance        Commercial Insurance: [address] [place] [address]

## 2023-02-01 ENCOUNTER — OFFICE VISIT (OUTPATIENT)
Dept: FAMILY MEDICINE CLINIC | Facility: CLINIC | Age: 23
End: 2023-02-01

## 2023-02-01 VITALS
OXYGEN SATURATION: 99 % | RESPIRATION RATE: 16 BRPM | WEIGHT: 133 LBS | DIASTOLIC BLOOD PRESSURE: 80 MMHG | SYSTOLIC BLOOD PRESSURE: 120 MMHG | HEART RATE: 76 BPM | TEMPERATURE: 96.8 F | HEIGHT: 67 IN | BODY MASS INDEX: 20.88 KG/M2

## 2023-02-01 DIAGNOSIS — F41.9 ANXIETY: Primary | ICD-10-CM

## 2023-02-01 NOTE — PATIENT INSTRUCTIONS
Continue with Lexapro 10 mg daily  Continue with ativan as needed  Follow up in 4 months  Please call the office if you are experiencing any worsening of symptoms or no symptom improvement

## 2023-02-01 NOTE — ASSESSMENT & PLAN NOTE
At this time patient prefers to continue with 10 mg Lexapro  she can continue with Ativan 0 5 mg twice a day as needed for high anxiety  Discussed potential side effects, patient will continue to monitor symptoms/ anxiety symptoms and call if she wishes to make any changes  Discussed how gene sight testing could be used if we needed to switch to a new medication  Please call the office if you are experiencing any worsening of symptoms or no symptom improvement  Recheck in 4 months

## 2023-02-01 NOTE — PROGRESS NOTES
Name: Diane Ear      : 2000      MRN: [id number]  Encounter Provider: AALIYAH Sandoval  Encounter Date: 2023   Encounter department: [facility] PRIMARY CARE    Assessment & Plan     1  Anxiety  Assessment & Plan: At this time patient prefers to continue with 10 mg Lexapro  she can continue with Ativan 0 5 mg twice a day as needed for high anxiety  Discussed potential side effects, patient will continue to monitor symptoms/ anxiety symptoms and call if she wishes to make any changes  Discussed how gene sight testing could be used if we needed to switch to a new medication  Please call the office if you are experiencing any worsening of symptoms or no symptom improvement  Recheck in 4 months  Subjective      Patient presents to the office today for anxiety follow-up  She has been on 10 mg of Lexapro for about 1-1/2 months  She states she overall is feeling a lot better  She notices large improvement in her heightened anxiety fighter flight symptoms  She states that she does not have as much irrational fear or anxiety  She feels her thoughts more manageable  She has noticed that she started shaking her legs a lot more and she is unsure if this relates to the medication as well as having increased sweating at bedtime  She states the sweating at bedtime is not every night maybe 2-3 times per week  She does report increased caffeine intake  She continues with Ativan as needed but uses this rarely  Review of Systems   Constitutional: Positive for diaphoresis  Negative for chills and fever  Eyes: Negative for discharge  Respiratory: Negative for shortness of breath  Cardiovascular: Negative for chest pain  Gastrointestinal: Negative for constipation and diarrhea  Genitourinary: Negative for difficulty urinating  Musculoskeletal: Negative for joint swelling  Skin: Negative for rash  Neurological: Negative for headaches     Hematological: Negative for adenopathy  Psychiatric/Behavioral: The patient is nervous/anxious  Current Outpatient Medications on File Prior to Visit   Medication Sig   • Albuterol Sulfate 108 (90 Base) MCG/ACT AEPB Inhale 2 puffs every 6 (six) hours as needed (wheezing/ shortness of breath/ prior to exercise)   • drospirenone-ethinyl estradiol (RAQUEL) 3-0 02 MG per tablet TAKE 1 TABLET DAILY   • escitalopram (LEXAPRO) 10 mg tablet TAKE 1 TABLET BY MOUTH DAILY   • LORazepam (Ativan) 0 5 mg tablet Take 1 tablet (0 5 mg total) by mouth 2 (two) times a day as needed for anxiety   • Tazorac 0 05 % cream APPLY TO THE FACE 2 TO 3 NIGHTS A WEEK AFTER MOISTURIZER   • gabapentin (NEURONTIN) 300 mg capsule Take 1 capsule (300 mg total) by mouth daily at bedtime (Patient not taking: Reported on 12/7/2022)   • tretinoin (ALTRALIN) 0 05 % APPLY PEA SIZED AMOUNT TO ENTIRE FACE EVERY NIGHT AT BEDTIME 1 HOUR AFTER WASHING FACE (Patient not taking: Reported on 8/25/2022)   • [DISCONTINUED] benzonatate (TESSALON PERLES) 100 mg capsule Take 1 capsule (100 mg total) by mouth 3 (three) times a day as needed for cough (Patient not taking: Reported on 2/1/2023)   • [DISCONTINUED] fluticasone (FLONASE) 50 mcg/act nasal spray 2 sprays into each nostril daily (Patient not taking: Reported on 2/1/2023)       Objective     /80   Pulse 76   Temp (!) 96 8 °F (36 °C) (Temporal)   Resp 16   Ht 5' 7" (1 702 m)   Wt 60 3 kg (133 lb)   SpO2 99%   BMI 20 83 kg/m²     Physical Exam  Vitals and nursing note reviewed  Constitutional:       General: She is not in acute distress  Appearance: She is well-developed  She is not diaphoretic  HENT:      Head: Normocephalic and atraumatic  Right Ear: External ear normal       Left Ear: External ear normal    Eyes:      General: Lids are normal          Right eye: No discharge  Left eye: No discharge        Conjunctiva/sclera: Conjunctivae normal    Cardiovascular:      Rate and Rhythm: Normal rate and regular rhythm  Heart sounds: No murmur heard  Pulmonary:      Effort: Pulmonary effort is normal  No respiratory distress  Breath sounds: Normal breath sounds  No wheezing  Musculoskeletal:         General: No deformity  Cervical back: Neck supple  Skin:     General: Skin is warm and dry  Neurological:      Mental Status: She is alert and oriented to person, place, and time  Psychiatric:         Speech: Speech normal          Behavior: Behavior normal          Thought Content:  Thought content normal          Judgment: Judgment normal        AALIYAH Garrison

## 2023-02-22 DIAGNOSIS — F41.9 ANXIETY: ICD-10-CM

## 2023-02-22 RX ORDER — ESCITALOPRAM OXALATE 10 MG/1
10 TABLET ORAL DAILY
Qty: 90 TABLET | Refills: 0 | Status: SHIPPED | OUTPATIENT
Start: 2023-02-22

## 2023-02-23 RX ORDER — LORAZEPAM 0.5 MG/1
0.5 TABLET ORAL 2 TIMES DAILY PRN
Qty: 30 TABLET | Refills: 0 | Status: SHIPPED | OUTPATIENT
Start: 2023-02-23

## 2023-05-01 ENCOUNTER — OFFICE VISIT (OUTPATIENT)
Dept: FAMILY MEDICINE CLINIC | Facility: CLINIC | Age: 23
End: 2023-05-01

## 2023-05-01 VITALS
WEIGHT: 135 LBS | TEMPERATURE: 96 F | DIASTOLIC BLOOD PRESSURE: 68 MMHG | HEART RATE: 67 BPM | HEIGHT: 67 IN | SYSTOLIC BLOOD PRESSURE: 108 MMHG | BODY MASS INDEX: 21.19 KG/M2 | OXYGEN SATURATION: 99 %

## 2023-05-01 DIAGNOSIS — N94.6 DYSMENORRHEA: ICD-10-CM

## 2023-05-01 DIAGNOSIS — F41.9 ANXIETY: Primary | ICD-10-CM

## 2023-05-01 DIAGNOSIS — R10.2 PELVIC PAIN: ICD-10-CM

## 2023-05-01 RX ORDER — ESCITALOPRAM OXALATE 10 MG/1
15 TABLET ORAL DAILY
Qty: 45 TABLET | Refills: 1 | Status: SHIPPED | OUTPATIENT
Start: 2023-05-01 | End: 2023-05-31

## 2023-05-01 RX ORDER — LORAZEPAM 1 MG/1
1 TABLET ORAL DAILY PRN
Qty: 30 TABLET | Refills: 0 | Status: SHIPPED | OUTPATIENT
Start: 2023-05-01

## 2023-05-01 NOTE — PROGRESS NOTES
Name: Bonnie Johnson      : 2000      MRN: [id number]  Encounter Provider: Ruby FinnrAALIYAH  Encounter Date: 2023   Encounter department: [facility] PRIMARY CARE    Assessment & Plan     1  Anxiety  -     escitalopram (LEXAPRO) 10 mg tablet; Take 1 5 tablets (15 mg total) by mouth daily  -     LORazepam (ATIVAN) 1 mg tablet; Take 1 tablet (1 mg total) by mouth daily as needed for anxiety    2  Pelvic pain  -     US pelvis complete w transvaginal; Future; Expected date: 2023    3  Dysmenorrhea    Increase Lexapro to 15 mg daily  Re-check in 4-6 weeks  Will increase Ativan to 1 mg per dose  Monitor night sweats and keep notes of any patterns and pay attention to caffeine intake/spicy foods/ any other patterns she did that day prior  Will have her complete pelvic US due to pelvic symptoms  Will follow up with results  Advised she may benefit from following up with GYN  May benefit from more labs if US abnormal  Please call the office if you are experiencing any worsening of symptoms or no symptom improvement  Subjective      Patient presents for 4 month re-check of anxiety  She states overall anxiety is doing a lot better  She states she isn't nearly as anxious as she was prior to lexapro  She states people around her notice a difference  Typical fears/triggers aren't nearly as bad  Able to stay more calm  She is still having small panic attacks/times of high anxiety about 1 x week  So she feels an increased dose would help  No side effects from the medication  At times of panic attacks she states she feels she needs to take 2 tabs of ativan instead of 1  Night sweats at times, unsure if this was before or after medication  Doesn't happen all the time  This happens a few times a week  Not consistent  She states she is unsure of any pattern  She states she's seen information on PCOS and endometriosis and would like to discuss possibility of this as a dx   She "has history of dysmenorrhea and her periods were slightly irregular in the past  Currently on OCP  She still has a lot of fatigue even if she sleeps well  Review of Systems   Constitutional: Negative for chills and fever  Eyes: Negative for discharge  Respiratory: Negative for shortness of breath  Cardiovascular: Negative for chest pain  Gastrointestinal: Negative for constipation and diarrhea  Genitourinary: Negative for difficulty urinating  Musculoskeletal: Negative for joint swelling  Skin: Negative for rash  Neurological: Negative for headaches  Hematological: Negative for adenopathy  Psychiatric/Behavioral: The patient is nervous/anxious          Current Outpatient Medications on File Prior to Visit   Medication Sig    Albuterol Sulfate 108 (90 Base) MCG/ACT AEPB Inhale 2 puffs every 6 (six) hours as needed (wheezing/ shortness of breath/ prior to exercise)    drospirenone-ethinyl estradiol (RAQULE) 3-0 02 MG per tablet TAKE 1 TABLET DAILY    [DISCONTINUED] escitalopram (LEXAPRO) 10 mg tablet Take 1 tablet (10 mg total) by mouth daily    [DISCONTINUED] LORazepam (Ativan) 0 5 mg tablet Take 1 tablet (0 5 mg total) by mouth 2 (two) times a day as needed for anxiety    [DISCONTINUED] gabapentin (NEURONTIN) 300 mg capsule Take 1 capsule (300 mg total) by mouth daily at bedtime (Patient not taking: Reported on 12/7/2022)    [DISCONTINUED] Tazorac 0 05 % cream APPLY TO THE FACE 2 TO 3 NIGHTS A WEEK AFTER MOISTURIZER (Patient not taking: Reported on 5/1/2023)    [DISCONTINUED] tretinoin (ALTRALIN) 0 05 % APPLY PEA SIZED AMOUNT TO ENTIRE FACE EVERY NIGHT AT BEDTIME 1 HOUR AFTER WASHING FACE (Patient not taking: Reported on 8/25/2022)       Objective     /68 (BP Location: Left arm, Patient Position: Sitting, Cuff Size: Adult)   Pulse 67   Temp (!) 96 °F (35 6 °C) (Temporal)   Ht 5' 7\" (1 702 m)   Wt 61 2 kg (135 lb)   LMP 04/27/2023 (Exact Date)   SpO2 99%   BMI 21 14 kg/m² " Physical Exam  Vitals and nursing note reviewed  Constitutional:       General: She is not in acute distress  Appearance: She is well-developed  She is not diaphoretic  HENT:      Head: Normocephalic and atraumatic  Right Ear: External ear normal       Left Ear: External ear normal    Eyes:      General: Lids are normal          Right eye: No discharge  Left eye: No discharge  Conjunctiva/sclera: Conjunctivae normal    Cardiovascular:      Rate and Rhythm: Normal rate and regular rhythm  Heart sounds: No murmur heard  Pulmonary:      Effort: Pulmonary effort is normal  No respiratory distress  Musculoskeletal:         General: No deformity  Cervical back: Neck supple  Skin:     General: Skin is warm and dry  Neurological:      Mental Status: She is alert and oriented to person, place, and time  Psychiatric:         Speech: Speech normal          Behavior: Behavior normal          Thought Content:  Thought content normal          Judgment: Judgment normal        AALIYAH Gotti

## 2023-05-01 NOTE — PATIENT INSTRUCTIONS
Increase lexapro to 15 mg daily  Refills sent to pharmacy  Just take 1 ativan as needed daily  Re-check in 4-6 weeks  Complete pelvic pain  Please call the office if you are experiencing any worsening of symptoms or no symptom improvement

## 2023-05-08 ENCOUNTER — TELEPHONE (OUTPATIENT)
Dept: FAMILY MEDICINE CLINIC | Facility: CLINIC | Age: 23
End: 2023-05-08

## 2023-05-08 NOTE — TELEPHONE ENCOUNTER
Prior auth started for escitalopram 10 mg 1 1/2 tablets daily via CoverMyMeds, Key#:  [id number]  Awaiting insurance response

## 2023-06-02 ENCOUNTER — OFFICE VISIT (OUTPATIENT)
Dept: FAMILY MEDICINE CLINIC | Facility: CLINIC | Age: 23
End: 2023-06-02

## 2023-06-02 VITALS
HEIGHT: 67 IN | TEMPERATURE: 96.9 F | RESPIRATION RATE: 16 BRPM | SYSTOLIC BLOOD PRESSURE: 102 MMHG | BODY MASS INDEX: 20.72 KG/M2 | OXYGEN SATURATION: 99 % | HEART RATE: 66 BPM | WEIGHT: 132 LBS | DIASTOLIC BLOOD PRESSURE: 60 MMHG

## 2023-06-02 DIAGNOSIS — R22.31 MASS OF FINGER OF RIGHT HAND: ICD-10-CM

## 2023-06-02 DIAGNOSIS — R10.2 PELVIC PAIN: ICD-10-CM

## 2023-06-02 DIAGNOSIS — F41.9 ANXIETY: Primary | ICD-10-CM

## 2023-06-02 NOTE — PATIENT INSTRUCTIONS
Complete pelvic US  Continue with 15 mg daily of Lexapro  Notify me of update in 2-3 weeks  We can increase to 20 mg if needed  Monitor finger  Call if no improvement/worsening lump  Please call the office if you are experiencing any worsening of symptoms or no symptom improvement

## 2023-06-02 NOTE — PROGRESS NOTES
Name: Hubert Barnard      : 2000      MRN: [id number]  Encounter Provider: AALIYAH An  Encounter Date: 2023   Encounter department: [facility] PRIMARY CARE    Assessment & Plan     1  Anxiety    2  Pelvic pain    3  Mass of finger of right hand      Continue with 15 mg daily of Lexapro  Notify me of update in 2-3 weeks  We can increase to 20 mg if needed  Monitor finger  Call if no improvement/worsening lump  Next step would be x ray  Complete pelvic US  Please call the office if you are experiencing any worsening of symptoms or no symptom improvement  Subjective        Patient presents to the office today for follow-up on anxiety  1 month follow up after increasing Lexapro from 10 mg to 15 mg  Hasn't noticed a large difference  Has been on new dose for about 3 weeks  Still using the Ativan sparingly as needed  Pelvic US ordered at last visit not yet completed  States that pelvic pain has improved so she can get it done  She also noted a lump on DIP joint of right fifth finger she would like looked at  No pain just noticed it today  Review of Systems   Constitutional: Negative for chills and fever  Eyes: Negative for discharge  Respiratory: Negative for shortness of breath  Cardiovascular: Negative for chest pain  Gastrointestinal: Negative for constipation and diarrhea  Genitourinary: Negative for difficulty urinating  Musculoskeletal: Negative for joint swelling  Skin: Negative for rash  Neurological: Negative for headaches  Hematological: Negative for adenopathy  Psychiatric/Behavioral: The patient is not nervous/anxious          Current Outpatient Medications on File Prior to Visit   Medication Sig   • Albuterol Sulfate 108 (90 Base) MCG/ACT AEPB Inhale 2 puffs every 6 (six) hours as needed (wheezing/ shortness of breath/ prior to exercise)   • drospirenone-ethinyl estradiol (RAQUEL) 3-0 02 MG per tablet TAKE 1 TABLET DAILY   • "escitalopram (LEXAPRO) 10 mg tablet Take 1 5 tablets (15 mg total) by mouth daily   • LORazepam (ATIVAN) 1 mg tablet Take 1 tablet (1 mg total) by mouth daily as needed for anxiety       Objective     /60   Pulse 66   Temp (!) 96 9 °F (36 1 °C) (Temporal)   Resp 16   Ht 5' 6 93\" (1 7 m)   Wt 59 9 kg (132 lb)   SpO2 99%   BMI 20 72 kg/m²     Physical Exam  Vitals and nursing note reviewed  Constitutional:       General: She is not in acute distress  Appearance: She is well-developed  She is not diaphoretic  HENT:      Head: Normocephalic and atraumatic  Right Ear: External ear normal       Left Ear: External ear normal    Eyes:      General: Lids are normal          Right eye: No discharge  Left eye: No discharge  Conjunctiva/sclera: Conjunctivae normal    Pulmonary:      Effort: Pulmonary effort is normal  No respiratory distress  Musculoskeletal:         General: No deformity  Arms:       Cervical back: Neck supple  Skin:     General: Skin is warm and dry  Neurological:      Mental Status: She is alert and oriented to person, place, and time  Psychiatric:         Speech: Speech normal          Behavior: Behavior normal          Thought Content:  Thought content normal          Judgment: Judgment normal        AALIYAH Torres  "

## 2023-06-20 ENCOUNTER — TELEPHONE (OUTPATIENT)
Dept: FAMILY MEDICINE CLINIC | Facility: CLINIC | Age: 23
End: 2023-06-20

## 2023-06-20 DIAGNOSIS — F41.9 ANXIETY: ICD-10-CM

## 2023-06-20 RX ORDER — ESCITALOPRAM OXALATE 10 MG/1
15 TABLET ORAL DAILY
Qty: 45 TABLET | Refills: 1 | Status: SHIPPED | OUTPATIENT
Start: 2023-06-20 | End: 2023-06-20 | Stop reason: SDUPTHER

## 2023-06-20 RX ORDER — ESCITALOPRAM OXALATE 10 MG/1
15 TABLET ORAL DAILY
Qty: 45 TABLET | Refills: 1 | Status: SHIPPED | OUTPATIENT
Start: 2023-06-20 | End: 2023-06-29 | Stop reason: SDUPTHER

## 2023-06-20 NOTE — TELEPHONE ENCOUNTER
Hi, my name is Isaac Lutz  My YOB: 2000  I had called a couple hours ago about a prescription refill for the Lexapro  I just received a message in my chart that it was refilled and or it will be refilled at the [facility]  I was wondering if there was any way it would be able to be refilled at the [place] on [address] [facility]   instead

## 2023-06-20 NOTE — TELEPHONE ENCOUNTER
I'm assuming the transcription meant [address]? I re-sent it  That's the only other pharmacy on file for her

## 2023-06-28 DIAGNOSIS — N92.6 IRREGULAR MENSES: ICD-10-CM

## 2023-06-28 DIAGNOSIS — L70.0 ACNE VULGARIS: ICD-10-CM

## 2023-06-28 RX ORDER — DROSPIRENONE AND ETHINYL ESTRADIOL 0.02-3(28)
KIT ORAL
Qty: 84 TABLET | Refills: 3 | Status: SHIPPED | OUTPATIENT
Start: 2023-06-28

## 2023-06-29 ENCOUNTER — TELEPHONE (OUTPATIENT)
Dept: FAMILY MEDICINE CLINIC | Facility: CLINIC | Age: 23
End: 2023-06-29

## 2023-06-29 DIAGNOSIS — F41.9 ANXIETY: ICD-10-CM

## 2023-06-29 RX ORDER — ESCITALOPRAM OXALATE 20 MG/1
20 TABLET ORAL DAILY
Qty: 30 TABLET | Refills: 1 | Status: SHIPPED | OUTPATIENT
Start: 2023-06-29 | End: 2023-07-29

## 2023-06-29 NOTE — TELEPHONE ENCOUNTER
----- Message from Sarah Crowder sent at 6/28/2023 12:37 PM EDT -----  Regarding: FW: Increasing Lexapro dosage   Contact: [phone]    ----- Message -----  From: Sharri Sandoval  Sent: 6/28/2023  11:39 AM EDT  To: [facility]  Subject: Increasing Lexapro dosage                        Ye Jackman, I know at my last appointment we discussed possibly going up to the 20mg of Lexapro  I still haven’t noticed a huge difference being on the 15mg and was wondering if I could start on the 20mg? Thanks!   Leydi Padron

## 2023-08-16 ENCOUNTER — VBI (OUTPATIENT)
Dept: ADMINISTRATIVE | Facility: OTHER | Age: 23
End: 2023-08-16

## 2023-09-04 ENCOUNTER — HOSPITAL ENCOUNTER (OUTPATIENT)
Dept: RADIOLOGY | Facility: HOSPITAL | Age: 23
Discharge: HOME | End: 2023-09-04
Payer: COMMERCIAL

## 2023-09-04 DIAGNOSIS — R10.2 PELVIC PAIN: ICD-10-CM

## 2023-09-04 PROCEDURE — 76830 TRANSVAGINAL US NON-OB: CPT

## 2023-09-04 PROCEDURE — 76856 US EXAM PELVIC COMPLETE: CPT

## 2023-09-10 DIAGNOSIS — F41.9 ANXIETY: ICD-10-CM

## 2023-09-11 RX ORDER — ESCITALOPRAM OXALATE 20 MG/1
20 TABLET ORAL DAILY
Qty: 30 TABLET | Refills: 1 | Status: SHIPPED | OUTPATIENT
Start: 2023-09-11

## 2023-10-17 ENCOUNTER — OFFICE VISIT (OUTPATIENT)
Dept: OBGYN CLINIC | Facility: CLINIC | Age: 23
End: 2023-10-17

## 2023-10-17 VITALS
BODY MASS INDEX: 22.41 KG/M2 | WEIGHT: 142.8 LBS | DIASTOLIC BLOOD PRESSURE: 62 MMHG | HEIGHT: 67 IN | SYSTOLIC BLOOD PRESSURE: 100 MMHG

## 2023-10-17 DIAGNOSIS — N93.0 PCB (POST COITAL BLEEDING): ICD-10-CM

## 2023-10-17 DIAGNOSIS — Z01.419 ENCNTR FOR GYN EXAM (GENERAL) (ROUTINE) W/O ABN FINDINGS: ICD-10-CM

## 2023-10-17 DIAGNOSIS — D25.9 UTERINE LEIOMYOMA, UNSPECIFIED LOCATION: Primary | ICD-10-CM

## 2023-10-17 PROCEDURE — G0145 SCR C/V CYTO,THINLAYER,RESCR: HCPCS | Performed by: STUDENT IN AN ORGANIZED HEALTH CARE EDUCATION/TRAINING PROGRAM

## 2023-10-17 NOTE — PROGRESS NOTES
Assessment/Plan:    Uterine leiomyoma  -Pelvic US 9/4/2: right subserosal 2.3 cm   -continue OCPs for dysmenorrhea  -follow up for annual exam     PCB (post coital bleeding)  -significant cervical ectropion present on exam   -pt due for pap, due to presence of ectropion and reported post coital bleeding, pap collected today        Diagnoses and all orders for this visit:    Uterine leiomyoma, unspecified location    Encntr for gyn exam (general) (routine) w/o abn findings  -     Liquid-based pap, screening    PCB (post coital bleeding)          Subjective:      Patient ID: Terrance Ross is a 24 y.o. female. 18yo G0 LMP 9/30/23 presents for follow up fibroids found on Pelvic US by PCP. Pt reports she takes OCPs for dysmenorrhea. Her pain is well controlled and has been on OCPs for 6 years. She denies breakthrough bleeding. She is sexually active and has had two episodes of post coital bleeding, last occurred two months ago. She denies dyspareunia. The following portions of the patient's history were reviewed and updated as appropriate: allergies, current medications, past family history, past medical history, past social history, past surgical history, and problem list.    Review of Systems   Constitutional:  Negative for appetite change, chills, fatigue and fever. Respiratory:  Negative for cough, chest tightness, shortness of breath and wheezing. Cardiovascular:  Negative for chest pain, palpitations and leg swelling. Gastrointestinal:  Negative for abdominal distention, abdominal pain, constipation, diarrhea, nausea and vomiting. Endocrine: Negative for cold intolerance, heat intolerance and polyuria. Genitourinary:  Negative for difficulty urinating, dyspareunia, dysuria, genital sores, menstrual problem, vaginal bleeding, vaginal discharge and vaginal pain. Neurological:  Negative for dizziness, weakness, light-headedness and headaches.          Objective:      /62 (BP Location: Right arm, Patient Position: Sitting, Cuff Size: Standard)   Ht 5' 6.54" (1.69 m)   Wt 64.8 kg (142 lb 12.8 oz)   LMP 09/30/2023 (Exact Date)   BMI 22.68 kg/m²          Physical Exam  Constitutional:       General: She is not in acute distress. Appearance: Normal appearance. She is normal weight. Cardiovascular:      Rate and Rhythm: Normal rate. Pulmonary:      Effort: Pulmonary effort is normal. No respiratory distress. Abdominal:      General: There is no distension. Palpations: There is no mass. Tenderness: There is no abdominal tenderness. There is no guarding or rebound. Genitourinary:     General: Normal vulva. Exam position: Lithotomy position. Pubic Area: No rash. Labia:         Right: No rash, tenderness, lesion or injury. Left: No rash, tenderness, lesion or injury. Urethra: No prolapse, urethral pain, urethral swelling or urethral lesion. Vagina: No signs of injury. No vaginal discharge, tenderness, bleeding, lesions or prolapsed vaginal walls. Cervix: No cervical motion tenderness, discharge, friability, lesion or erythema. Uterus: Not deviated, not enlarged, not fixed, not tender and no uterine prolapse. Adnexa:         Right: No mass, tenderness or fullness. Left: No mass, tenderness or fullness. Comments: Significant cervical ectropion present   Musculoskeletal:      Right lower leg: No edema. Left lower leg: No edema. Neurological:      Mental Status: She is alert and oriented to person, place, and time.    Psychiatric:         Mood and Affect: Mood normal.

## 2023-10-17 NOTE — ASSESSMENT & PLAN NOTE
-significant cervical ectropion present on exam   -pt due for pap, due to presence of ectropion and reported post coital bleeding, pap collected today

## 2023-10-17 NOTE — ASSESSMENT & PLAN NOTE
-Pelvic US 9/4/2: right subserosal 2.3 cm   -continue OCPs for dysmenorrhea  -follow up for annual exam

## 2023-10-23 LAB
LAB AP GYN PRIMARY INTERPRETATION: NORMAL
Lab: NORMAL

## 2023-10-24 DIAGNOSIS — F41.9 ANXIETY: ICD-10-CM

## 2023-10-24 RX ORDER — ESCITALOPRAM OXALATE 20 MG/1
20 TABLET ORAL DAILY
Qty: 30 TABLET | Refills: 5 | Status: SHIPPED | OUTPATIENT
Start: 2023-10-24

## 2023-10-24 RX ORDER — LORAZEPAM 1 MG/1
1 TABLET ORAL DAILY PRN
Qty: 30 TABLET | Refills: 0 | Status: SHIPPED | OUTPATIENT
Start: 2023-10-24

## 2023-10-25 DIAGNOSIS — N92.6 IRREGULAR MENSES: ICD-10-CM

## 2023-10-25 DIAGNOSIS — L70.0 ACNE VULGARIS: ICD-10-CM

## 2023-10-26 RX ORDER — DROSPIRENONE AND ETHINYL ESTRADIOL 0.02-3(28)
1 KIT ORAL DAILY
Qty: 84 TABLET | Refills: 3 | Status: SHIPPED | OUTPATIENT
Start: 2023-10-26

## 2023-12-04 ENCOUNTER — TELEMEDICINE (OUTPATIENT)
Dept: FAMILY MEDICINE CLINIC | Facility: CLINIC | Age: 23
End: 2023-12-04
Payer: COMMERCIAL

## 2023-12-04 DIAGNOSIS — N86 CERVICAL ECTROPION: ICD-10-CM

## 2023-12-04 DIAGNOSIS — D25.9 UTERINE LEIOMYOMA, UNSPECIFIED LOCATION: ICD-10-CM

## 2023-12-04 DIAGNOSIS — F41.9 ANXIETY: Primary | ICD-10-CM

## 2023-12-04 PROCEDURE — 99214 OFFICE O/P EST MOD 30 MIN: CPT | Performed by: NURSE PRACTITIONER

## 2023-12-04 NOTE — PROGRESS NOTES
Virtual Regular Visit    Verification of patient location:    Patient is located at Other in the following state in which I hold an active license [place]      Assessment/Plan:    Problem List Items Addressed This Visit          Genitourinary    Uterine leiomyoma       Other    Anxiety - Primary     Other Visit Diagnoses       Cervical ectropion              Overall she is doing well. Psychiatric symptoms well managed/stable. No contraindications for scuba diving trip. Continue with Lexapro 20 mg and ativan PRN. Would not use ativan while diving- this is rarely used by patient. Discussed cervical ectropion and how this is likely due to OCP. Recheck in 6 months for anxiety follow up and yearly exam.          Reason for visit is No chief complaint on file. Encounter provider Hailee Bradshaw    Provider located at [address] [place] [address] PRIMARY CARE  [address] [facility] [id number] [phone]      Recent Visits  No visits were found meeting these conditions. Showing recent visits within past 7 days and meeting all other requirements  Today's Visits  Date Type Provider Dept   12/04/23 Telemedicine Charleen Farnsworth, [address] [facility] Primary Care   Showing today's visits and meeting all other requirements  Future Appointments  No visits were found meeting these conditions. Showing future appointments within next 150 days and meeting all other requirements       The patient was identified by name and date of birth. [place] was informed that this is a telemedicine visit and that the visit is being conducted through the [address] [place] [address] [employer] platform. She agrees to proceed. .  My office door was closed. No one else was in the room. She acknowledged consent and understanding of privacy and security of the video platform. The patient has agreed to participate and understands they can discontinue the visit at any time.     Patient is aware this is a [facility] service. Subjective  Ivelisse Gilliam is a 24 y.o. female. Here for 6 month f/u anxiety. Currently on 20 lexapro and 1 mg ativan PRN. Overall feeling well. Ativan use about 3-4x a month. Has upcoming diving trip. Recently saw OBGYN and has questions regarding exam and cervical changes noted. Past Medical History:   Diagnosis Date    Acne     Angina at rest     Anxiety     Asthma        History reviewed. No pertinent surgical history. Current Outpatient Medications   Medication Sig Dispense Refill    escitalopram (LEXAPRO) 20 mg tablet Take 1 tablet (20 mg total) by mouth daily 30 tablet 5    LORazepam (ATIVAN) 1 mg tablet Take 1 tablet (1 mg total) by mouth daily as needed for anxiety 30 tablet 0    Albuterol Sulfate 108 (90 Base) MCG/ACT AEPB Inhale 2 puffs every 6 (six) hours as needed (wheezing/ shortness of breath/ prior to exercise) 1 each 1    drospirenone-ethinyl estradiol (RAQUEL) 3-0.02 MG per tablet Take 1 tablet by mouth daily 84 tablet 3     No current facility-administered medications for this visit. No Known Allergies    Review of Systems   Constitutional:  Negative for chills and fever. Eyes:  Negative for discharge. Respiratory:  Negative for shortness of breath. Cardiovascular:  Negative for chest pain. Gastrointestinal:  Negative for constipation and diarrhea. Genitourinary:  Negative for difficulty urinating. Musculoskeletal:  Negative for joint swelling. Skin:  Negative for rash. Neurological:  Negative for headaches. Hematological:  Negative for adenopathy. Psychiatric/Behavioral:  The patient is not nervous/anxious. Video Exam    There were no vitals filed for this visit. Physical Exam  Constitutional:       General: She is not in acute distress. Appearance: Normal appearance. She is not ill-appearing, toxic-appearing or diaphoretic. HENT:      Head: Normocephalic and atraumatic.       Nose: Nose normal.   Pulmonary: Effort: Pulmonary effort is normal. No respiratory distress. Skin:     Coloration: Skin is not pale. Neurological:      Mental Status: She is alert and oriented to person, place, and time.    Psychiatric:         Mood and Affect: Mood normal.          Visit Time  Total Visit Duration: 20

## 2023-12-16 PROBLEM — Z01.419 ENCNTR FOR GYN EXAM (GENERAL) (ROUTINE) W/O ABN FINDINGS: Status: RESOLVED | Noted: 2023-10-17 | Resolved: 2023-12-16

## 2024-02-13 DIAGNOSIS — N92.6 IRREGULAR MENSES: ICD-10-CM

## 2024-02-13 DIAGNOSIS — L70.0 ACNE VULGARIS: ICD-10-CM

## 2024-02-13 RX ORDER — DROSPIRENONE AND ETHINYL ESTRADIOL 0.02-3(28)
1 KIT ORAL DAILY
Qty: 84 TABLET | Refills: 1 | Status: SHIPPED | OUTPATIENT
Start: 2024-02-13

## 2024-02-15 ENCOUNTER — OFFICE VISIT (OUTPATIENT)
Dept: FAMILY MEDICINE CLINIC | Facility: CLINIC | Age: 24
End: 2024-02-15
Payer: COMMERCIAL

## 2024-02-15 VITALS
TEMPERATURE: 97.5 F | SYSTOLIC BLOOD PRESSURE: 100 MMHG | WEIGHT: 149 LBS | BODY MASS INDEX: 23.39 KG/M2 | HEART RATE: 81 BPM | HEIGHT: 67 IN | DIASTOLIC BLOOD PRESSURE: 68 MMHG | OXYGEN SATURATION: 99 %

## 2024-02-15 DIAGNOSIS — M25.532 LEFT WRIST PAIN: Primary | ICD-10-CM

## 2024-02-15 PROCEDURE — 99213 OFFICE O/P EST LOW 20 MIN: CPT | Performed by: NURSE PRACTITIONER

## 2024-02-15 NOTE — PATIENT INSTRUCTIONS
Complete x ray- this is walk in.     Start wearing brace at bedtime only. If no improvement then follow up with specialist.     We will follow up with results.     Please call the office if you are experiencing any worsening of symptoms or no symptom improvement.

## 2024-02-15 NOTE — PROGRESS NOTES
"Name: Jillian James      : 2000      MRN: [id number]  Encounter Provider: AALIYAH Negron  Encounter Date: 2/15/2024   Encounter department: [facility] PRIMARY CARE    Assessment & Plan     1. Left wrist pain  -     XR wrist 3+ vw left; Future; Expected date: 02/15/2024      Complete x ray- this is walk in.   Start wearing brace at bedtime only. If no improvement then follow up with specialist. We will follow up with results.   Please call the office if you are experiencing any worsening of symptoms or no symptom improvement.       Depression Screening and Follow-up Plan: Patient was screened for depression during today's encounter. They screened negative with a PHQ-2 score of 0.        Subjective      Here for evaluation of left wrist pain. Pain is worse at night. Left wrist pain into left pinky and up towards forearm.   No injury. Right handed. Has been using hands/wrists a lot for work.         Wrist Pain       Review of Systems   Musculoskeletal:  Positive for arthralgias.       Current Outpatient Medications on File Prior to Visit   Medication Sig    Albuterol Sulfate 108 (90 Base) MCG/ACT AEPB Inhale 2 puffs every 6 (six) hours as needed (wheezing/ shortness of breath/ prior to exercise)    drospirenone-ethinyl estradiol (RAQUEL) 3-0.02 MG per tablet TAKE 1 TABLET DAILY    escitalopram (LEXAPRO) 20 mg tablet Take 1 tablet (20 mg total) by mouth daily    LORazepam (ATIVAN) 1 mg tablet Take 1 tablet (1 mg total) by mouth daily as needed for anxiety       Objective     /68   Pulse 81   Temp 97.5 °F (36.4 °C) (Temporal)   Ht 5' 6.5\" (1.689 m)   Wt 67.6 kg (149 lb)   SpO2 99%   BMI 23.69 kg/m²     Physical Exam  Vitals and nursing note reviewed.   Constitutional:       General: She is not in acute distress.     Appearance: Normal appearance. She is well-developed. She is not diaphoretic.   HENT:      Head: Normocephalic and atraumatic.      Right Ear: External ear normal. "      Left Ear: External ear normal.   Eyes:      General: Lids are normal.         Right eye: No discharge.         Left eye: No discharge.      Conjunctiva/sclera: Conjunctivae normal.   Cardiovascular:      Pulses:           Radial pulses are 2+ on the right side and 2+ on the left side.      Comments: Ulnar +2 bilaterally   Pulmonary:      Effort: Pulmonary effort is normal. No respiratory distress.   Musculoskeletal:         General: No deformity.      Comments: + Tinel and + Phalen left wrist   strength right side +5 and left side +4    Skin:     General: Skin is warm and dry.      Capillary Refill: Capillary refill takes less than 2 seconds.   Neurological:      General: No focal deficit present.      Mental Status: She is alert and oriented to person, place, and time.   Psychiatric:         Speech: Speech normal.         Behavior: Behavior normal.         Thought Content: Thought content normal.         Judgment: Judgment normal.       AALIYAH Negron

## 2024-03-28 ENCOUNTER — APPOINTMENT (OUTPATIENT)
Dept: RADIOLOGY | Age: 24
End: 2024-03-28
Payer: COMMERCIAL

## 2024-03-28 DIAGNOSIS — M25.532 LEFT WRIST PAIN: ICD-10-CM

## 2024-03-28 PROCEDURE — 73110 X-RAY EXAM OF WRIST: CPT

## 2024-04-05 ENCOUNTER — TELEPHONE (OUTPATIENT)
Age: 24
End: 2024-04-05

## 2024-04-05 NOTE — TELEPHONE ENCOUNTER
The xray report has not been read yet. I called the reading room they will have this read now. Spoke with patient we will call her when the results are back.

## 2024-04-25 DIAGNOSIS — F41.9 ANXIETY: ICD-10-CM

## 2024-04-25 RX ORDER — ESCITALOPRAM OXALATE 20 MG/1
20 TABLET ORAL DAILY
Qty: 30 TABLET | Refills: 5 | Status: SHIPPED | OUTPATIENT
Start: 2024-04-25

## 2024-07-10 DIAGNOSIS — F41.9 ANXIETY: ICD-10-CM

## 2024-07-10 RX ORDER — LORAZEPAM 1 MG/1
1 TABLET ORAL DAILY PRN
Qty: 30 TABLET | Refills: 0 | Status: SHIPPED | OUTPATIENT
Start: 2024-07-10

## 2024-07-14 DIAGNOSIS — L70.0 ACNE VULGARIS: ICD-10-CM

## 2024-07-14 DIAGNOSIS — N92.6 IRREGULAR MENSES: ICD-10-CM

## 2024-07-14 RX ORDER — DROSPIRENONE AND ETHINYL ESTRADIOL 0.02-3(28)
1 KIT ORAL DAILY
Qty: 84 TABLET | Refills: 1 | Status: SHIPPED | OUTPATIENT
Start: 2024-07-14

## 2024-07-22 DIAGNOSIS — F41.9 ANXIETY: ICD-10-CM

## 2024-07-25 RX ORDER — LORAZEPAM 1 MG/1
TABLET ORAL
Qty: 30 TABLET | Refills: 0 | Status: SHIPPED | OUTPATIENT
Start: 2024-07-25

## 2024-07-25 NOTE — TELEPHONE ENCOUNTER
Patient called the RX Refill Line. Message is being forwarded to the office.     Patient is requesting LORazepam (ATIVAN) 1 mg tablet, patient stated that she was late to  the approved medication. She was told by the pharmacist that they are awaiting approval from the provider in order to release the medication. Patient currently does not have any medication at this time. Please review     Please contact patient at [phone]

## 2024-09-30 ENCOUNTER — OFFICE VISIT (OUTPATIENT)
Dept: FAMILY MEDICINE CLINIC | Facility: CLINIC | Age: 24
End: 2024-09-30
Payer: COMMERCIAL

## 2024-09-30 VITALS
HEART RATE: 85 BPM | RESPIRATION RATE: 16 BRPM | WEIGHT: 155 LBS | HEIGHT: 66 IN | BODY MASS INDEX: 24.91 KG/M2 | SYSTOLIC BLOOD PRESSURE: 102 MMHG | DIASTOLIC BLOOD PRESSURE: 60 MMHG | OXYGEN SATURATION: 99 % | TEMPERATURE: 98.1 F

## 2024-09-30 DIAGNOSIS — F41.9 ANXIETY: ICD-10-CM

## 2024-09-30 DIAGNOSIS — L42 PITYRIASIS ROSEA: Primary | ICD-10-CM

## 2024-09-30 PROCEDURE — 99214 OFFICE O/P EST MOD 30 MIN: CPT | Performed by: NURSE PRACTITIONER

## 2024-09-30 RX ORDER — PREDNISONE 10 MG/1
TABLET ORAL
Qty: 21 TABLET | Refills: 0 | Status: SHIPPED | OUTPATIENT
Start: 2024-09-30

## 2024-09-30 RX ORDER — ESCITALOPRAM OXALATE 20 MG/1
20 TABLET ORAL DAILY
Qty: 90 TABLET | Refills: 1 | Status: SHIPPED | OUTPATIENT
Start: 2024-09-30

## 2024-09-30 NOTE — PATIENT INSTRUCTIONS
Start prednisone, this is the steroid. It will be 6 pills today and decrease by 1 pill each day for the following 6 days. So it will be 6-5-4-3-2-1. Take this with food as it may upset your stomach. It's best to take it earlier in the day otherwise it may keep you up at night. Do not take this with NSAIDs such as advil/ibuprofen/advil/aleve/motrin.     Monitor closely. Can continue with allergy medication.     Continue to follow up with derm.     Please call the office if you are experiencing any worsening of symptoms or no symptom improvement.

## 2024-09-30 NOTE — PROGRESS NOTES
Ambulatory Visit  Name: Jillian James      : 2000      MRN: [id number]  Encounter Provider: AALIYAH Negron  Encounter Date: 2024   Encounter department: [facility] PRIMARY CARE    Assessment & Plan  Pityriasis rosea  Discussed etiology of rash with patient as well as treatment plan/recommendations.  Advised this is self-limiting and will resolve on its own but may take some time.  Did discuss use of allergy medications or steroids at times for symptom management but does not guarantee that it is going to help with rash spreading.  Patient is very scared about rash spreading would like to try prednisone if possible.  Advised patient of red flag symptoms as far as what to watch out for call for any changes.  Skin changes on right breast seem to be consistent with integumentary/rash only as there are also skin changes extending onto the left side of chest that are similar.  If no improvement will need further follow-up.  Continue with plan to follow-up with dermatology  Orders:    predniSONE 10 mg tablet; Day 1: 6 tabs  Day 2: 5 tabs Day 3: 4 tabs  Day 4: 3 tabs  Day 5: 2 tabs  Day 6: 1 tab    Anxiety  Stable on Lexapro 20  Orders:    escitalopram (LEXAPRO) 20 mg tablet; Take 1 tablet (20 mg total) by mouth daily       History of Present Illness     Patient presents for evaluation of a rash.  Started on right side of breast and then moved to left breast and on back.  Rash present x 1 week. It started showing up. So far has tried an eczema cream on it. Rash doesn't itch.  Has an appointment with dermatology next month.  No breast changes/pain.      Rash  Pertinent negatives include no diarrhea, fever or shortness of breath.         Review of Systems   Constitutional:  Negative for chills and fever.   Eyes:  Negative for discharge.   Respiratory:  Negative for shortness of breath.    Cardiovascular:  Negative for chest pain.   Gastrointestinal:  Negative for constipation and  "diarrhea.   Genitourinary:  Negative for difficulty urinating.   Musculoskeletal:  Negative for joint swelling.   Skin:  Positive for rash.   Neurological:  Negative for headaches.   Hematological:  Negative for adenopathy.   Psychiatric/Behavioral:  The patient is not nervous/anxious.            Objective     /60   Pulse 85   Temp 98.1 °F (36.7 °C) (Temporal)   Resp 16   Ht 5' 6\" (1.676 m)   Wt 70.3 kg (155 lb)   SpO2 99%   BMI 25.02 kg/m²     Physical Exam  Vitals and nursing note reviewed.   Constitutional:       General: She is not in acute distress.     Appearance: Normal appearance. She is well-developed. She is not diaphoretic.   HENT:      Head: Normocephalic and atraumatic.      Right Ear: External ear normal.      Left Ear: External ear normal.   Eyes:      General: Lids are normal.         Right eye: No discharge.         Left eye: No discharge.      Conjunctiva/sclera: Conjunctivae normal.   Pulmonary:      Effort: Pulmonary effort is normal. No respiratory distress.   Musculoskeletal:         General: No deformity.   Skin:     General: Skin is warm and dry.             Comments: Diffusely spread raised erythematous dry dermatitis some areas with scaling most consistent with pityriasis rosea with herald patch present on the right lateral chest.   Neurological:      General: No focal deficit present.      Mental Status: She is alert and oriented to person, place, and time.   Psychiatric:         Speech: Speech normal.         Behavior: Behavior normal.         Thought Content: Thought content normal.         Judgment: Judgment normal.         "

## 2024-10-01 NOTE — ASSESSMENT & PLAN NOTE
Stable on Lexapro 20  Orders:    escitalopram (LEXAPRO) 20 mg tablet; Take 1 tablet (20 mg total) by mouth daily     The patient's goals for the shift include For blood sugars to decrease    The clinical goals for the shift include For pt blood sugars to decrease    Over the shift, Nataliia's blood sugars have decreased. Most recent at 0600 was 324. Her next VBG will be at 0700. A current rfp and mag are pending at this time from 0500. She is currently on the two bag system of NS and D10 with insulin. She is A/O x4 and appropriate at this time. She is progressing.

## 2024-10-14 DIAGNOSIS — L70.0 ACNE VULGARIS: ICD-10-CM

## 2024-10-14 DIAGNOSIS — N92.6 IRREGULAR MENSES: ICD-10-CM

## 2024-10-15 RX ORDER — DROSPIRENONE AND ETHINYL ESTRADIOL 0.02-3(28)
1 KIT ORAL DAILY
Qty: 84 TABLET | Refills: 1 | Status: SHIPPED | OUTPATIENT
Start: 2024-10-15

## 2025-01-02 DIAGNOSIS — N92.6 IRREGULAR MENSES: ICD-10-CM

## 2025-01-02 DIAGNOSIS — L70.0 ACNE VULGARIS: ICD-10-CM

## 2025-01-03 RX ORDER — DROSPIRENONE AND ETHINYL ESTRADIOL 0.02-3(28)
1 KIT ORAL DAILY
Qty: 84 TABLET | Refills: 0 | Status: SHIPPED | OUTPATIENT
Start: 2025-01-03

## 2025-04-21 DIAGNOSIS — F41.9 ANXIETY: ICD-10-CM

## 2025-04-21 RX ORDER — ESCITALOPRAM OXALATE 20 MG/1
20 TABLET ORAL DAILY
Qty: 90 TABLET | Refills: 1 | Status: SHIPPED | OUTPATIENT
Start: 2025-04-21

## 2025-04-22 ENCOUNTER — OFFICE VISIT (OUTPATIENT)
Dept: FAMILY MEDICINE CLINIC | Facility: CLINIC | Age: 25
End: 2025-04-22
Payer: COMMERCIAL

## 2025-04-22 VITALS
TEMPERATURE: 97.5 F | SYSTOLIC BLOOD PRESSURE: 116 MMHG | DIASTOLIC BLOOD PRESSURE: 68 MMHG | BODY MASS INDEX: 24.37 KG/M2 | WEIGHT: 151 LBS | HEART RATE: 82 BPM | OXYGEN SATURATION: 99 %

## 2025-04-22 DIAGNOSIS — L42 PITYRIASIS ROSEA: ICD-10-CM

## 2025-04-22 DIAGNOSIS — F41.9 ANXIETY: ICD-10-CM

## 2025-04-22 DIAGNOSIS — Z86.39 HISTORY OF IRON DEFICIENCY: ICD-10-CM

## 2025-04-22 DIAGNOSIS — Z00.00 HEALTH MAINTENANCE EXAMINATION: Primary | ICD-10-CM

## 2025-04-22 DIAGNOSIS — J45.990 EXERCISE-INDUCED ASTHMA: ICD-10-CM

## 2025-04-22 DIAGNOSIS — H69.93 DYSFUNCTION OF BOTH EUSTACHIAN TUBES: ICD-10-CM

## 2025-04-22 PROBLEM — G89.29 CHRONIC BILATERAL LOW BACK PAIN WITH BILATERAL SCIATICA: Status: RESOLVED | Noted: 2022-07-26 | Resolved: 2025-04-22

## 2025-04-22 PROBLEM — R07.89 ATYPICAL CHEST PAIN: Status: RESOLVED | Noted: 2019-12-16 | Resolved: 2025-04-22

## 2025-04-22 PROBLEM — R00.2 PALPITATIONS: Status: RESOLVED | Noted: 2019-12-16 | Resolved: 2025-04-22

## 2025-04-22 PROBLEM — M54.42 CHRONIC BILATERAL LOW BACK PAIN WITH BILATERAL SCIATICA: Status: RESOLVED | Noted: 2022-07-26 | Resolved: 2025-04-22

## 2025-04-22 PROBLEM — M54.41 CHRONIC BILATERAL LOW BACK PAIN WITH BILATERAL SCIATICA: Status: RESOLVED | Noted: 2022-07-26 | Resolved: 2025-04-22

## 2025-04-22 PROCEDURE — 99395 PREV VISIT EST AGE 18-39: CPT | Performed by: NURSE PRACTITIONER

## 2025-04-22 NOTE — PATIENT INSTRUCTIONS
Start Flonase, this is an intranasal corticosteroid. This is 1-2 sprays each nostril once daily. Please be aware that this can cause nasal mucosa irritation. Stop using if you experience any nose bleeds. This can be used for allergy symptoms as well as ear discomfort/pressure.     Return 6 months for med check.       Please call the office if you are experiencing any worsening of symptoms or no symptom improvement.

## 2025-04-22 NOTE — PROGRESS NOTES
Adult Annual Physical  Name: Jillian James      : 2000      MRN: [id number]  Encounter Provider: AALIYAH Negron  Encounter Date: 2025   Encounter department: [facility] PRIMARY CARE    :  Assessment & Plan  Health maintenance examination  Screenings up to date.   Will order routine labs.   Get vaccination records- possibly due for gardisil series and Tdap  Orders:  •  TSH, 3rd generation with Free T4 reflex; Future  •  Lipid panel; Future  •  CBC and differential; Future  •  Comprehensive metabolic panel; Future    Anxiety  Stable on Lexapro 20 mg   Ativan about monthly use PRN (rare)       Exercise-induced asthma  Well controlled, rare use of inhaler.        Dysfunction of both eustachian tubes  Start Flonase, this is an intranasal corticosteroid. This is 1-2 sprays each nostril once daily. Please be aware that this can cause nasal mucosa irritation. Stop using if you experience any nose bleeds. This can be used for allergy symptoms as well as ear discomfort/pressure.        History of iron deficiency  Update labs  Orders:  •  CBC and differential; Future  •  Iron Panel (Includes Ferritin, Iron Sat%, Iron, and TIBC); Future    Pityriasis rosea  Reviewed with patient that this can happen more than once for some patients but it is very rare.  Recommend continue to follow with dermatology.  Per their plan if any recurrence they recommended biopsy.  She will follow-up with them.           Preventive Screenings:    - Cervical cancer screening: screening up-to-date     Immunizations:  - Immunizations due: Influenza, Prevnar 20 and Tdap         History of Present Illness     Adult Annual Physical:  Patient presents for annual physical. Here for well visit.     Left ear fullness pressure- improving. Did have sore throat that resolved. Possibly allergy related.     In September was seen for rash initially dx with pityriasis rosea, states it seemed like it came back so she went to derm  and now going away again. Rare re-occurrence per derm..     Diet and Physical Activity:  - Diet/Nutrition: well balanced diet.  - Exercise: moderate cardiovascular exercise, 3-4 times a week on average and 1-2 hours on average.    Depression Screening:  - PHQ-2 Score: 0    General Health:  - Sleep: sleeps well and 4-6 hours of sleep on average.  - Hearing: normal hearing right ear and normal hearing left ear.  - Vision: no vision problems.  - Dental: regular dental visits and brushes teeth twice daily.    /GYN Health:  - Follows with GYN: yes.   - Last menstrual cycle: 3/22/2025.   - History of STDs: no  - Contraception: oral contraceptives.      Advanced Care Planning:  - Has an advanced directive?: no    - Has a durable medical POA?: no    - ACP document given to patient?: no      Review of Systems   Constitutional:  Negative for chills and fever.   HENT:          Left ear fullness   Eyes:  Negative for discharge.   Respiratory:  Negative for shortness of breath.    Cardiovascular:  Negative for chest pain.   Gastrointestinal:  Negative for constipation and diarrhea.   Genitourinary:  Negative for difficulty urinating.   Musculoskeletal:  Negative for joint swelling.   Skin:  Negative for rash.   Neurological:  Negative for headaches.   Hematological:  Negative for adenopathy.   Psychiatric/Behavioral:  The patient is not nervous/anxious.          Objective   /68   Pulse 82   Temp 97.5 °F (36.4 °C) (Temporal)   Wt 68.5 kg (151 lb)   SpO2 99%   BMI 24.37 kg/m²     Physical Exam  Vitals and nursing note reviewed.   Constitutional:       General: She is not in acute distress.     Appearance: Normal appearance. She is not ill-appearing, toxic-appearing or diaphoretic.   HENT:      Head: Normocephalic and atraumatic.      Right Ear: External ear normal. Tympanic membrane is retracted. Tympanic membrane is not perforated or erythematous.      Left Ear: External ear normal. Tympanic membrane is retracted.  Tympanic membrane is not perforated or erythematous.      Nose: Nose normal.      Mouth/Throat:      Mouth: Mucous membranes are moist.      Pharynx: Oropharynx is clear. No oropharyngeal exudate or posterior oropharyngeal erythema.   Eyes:      General: Lids are normal. No scleral icterus.        Right eye: No discharge.         Left eye: No discharge.      Extraocular Movements: Extraocular movements intact.      Conjunctiva/sclera: Conjunctivae normal.      Pupils: Pupils are equal, round, and reactive to light.   Cardiovascular:      Rate and Rhythm: Normal rate and regular rhythm. No extrasystoles are present.     Heart sounds: S1 normal and S2 normal. No murmur heard.  Pulmonary:      Effort: Pulmonary effort is normal. No respiratory distress.      Breath sounds: Normal breath sounds. No stridor or decreased air movement. No wheezing or rhonchi.   Abdominal:      General: Bowel sounds are normal.      Palpations: Abdomen is soft.   Musculoskeletal:         General: Normal range of motion.      Cervical back: Normal range of motion and neck supple.   Skin:     General: Skin is warm and dry.   Neurological:      General: No focal deficit present.      Mental Status: She is alert and oriented to person, place, and time. Mental status is at baseline.      Cranial Nerves: No cranial nerve deficit.      Sensory: No sensory deficit.      Motor: No weakness.      Coordination: Coordination normal.      Gait: Gait normal.   Psychiatric:         Attention and Perception: Attention and perception normal.         Mood and Affect: Mood and affect normal.         Speech: Speech normal.         Behavior: Behavior is cooperative.         Cognition and Memory: Cognition normal.         Judgment: Judgment normal.

## 2025-04-23 LAB
ALBUMIN SERPL-MCNC: 4.2 G/DL (ref 3.6–5.1)
ALBUMIN/GLOB SERPL: 1.6 (CALC) (ref 1–2.5)
ALP SERPL-CCNC: 42 U/L (ref 31–125)
ALT SERPL-CCNC: 10 U/L (ref 6–29)
AST SERPL-CCNC: 14 U/L (ref 10–30)
BASOPHILS # BLD AUTO: 22 CELLS/UL (ref 0–200)
BASOPHILS NFR BLD AUTO: 0.4 %
BILIRUB SERPL-MCNC: 0.4 MG/DL (ref 0.2–1.2)
BUN SERPL-MCNC: 11 MG/DL (ref 7–25)
BUN/CREAT SERPL: NORMAL (CALC) (ref 6–22)
CALCIUM SERPL-MCNC: 9.4 MG/DL (ref 8.6–10.2)
CHLORIDE SERPL-SCNC: 105 MMOL/L (ref 98–110)
CHOLEST SERPL-MCNC: 221 MG/DL
CHOLEST/HDLC SERPL: 2.9 (CALC)
CO2 SERPL-SCNC: 27 MMOL/L (ref 20–32)
CREAT SERPL-MCNC: 0.84 MG/DL (ref 0.5–0.96)
EOSINOPHIL # BLD AUTO: 130 CELLS/UL (ref 15–500)
EOSINOPHIL NFR BLD AUTO: 2.4 %
ERYTHROCYTE [DISTWIDTH] IN BLOOD BY AUTOMATED COUNT: 12.6 % (ref 11–15)
FERRITIN SERPL-MCNC: 16 NG/ML (ref 16–154)
GFR/BSA.PRED SERPLBLD CYS-BASED-ARV: 98 ML/MIN/1.73M2
GLOBULIN SER CALC-MCNC: 2.6 G/DL (CALC) (ref 1.9–3.7)
GLUCOSE SERPL-MCNC: 95 MG/DL (ref 65–99)
HCT VFR BLD AUTO: 41.6 % (ref 35–45)
HDLC SERPL-MCNC: 77 MG/DL
HGB BLD-MCNC: 13.6 G/DL (ref 11.7–15.5)
IRON SATN MFR SERPL: 13 % (CALC) (ref 16–45)
IRON SERPL-MCNC: 72 MCG/DL (ref 40–190)
LDLC SERPL CALC-MCNC: 115 MG/DL (CALC)
LYMPHOCYTES # BLD AUTO: 2041 CELLS/UL (ref 850–3900)
LYMPHOCYTES NFR BLD AUTO: 37.8 %
MCH RBC QN AUTO: 28 PG (ref 27–33)
MCHC RBC AUTO-ENTMCNC: 32.7 G/DL (ref 32–36)
MCV RBC AUTO: 85.8 FL (ref 80–100)
MONOCYTES # BLD AUTO: 356 CELLS/UL (ref 200–950)
MONOCYTES NFR BLD AUTO: 6.6 %
NEUTROPHILS # BLD AUTO: 2851 CELLS/UL (ref 1500–7800)
NEUTROPHILS NFR BLD AUTO: 52.8 %
NONHDLC SERPL-MCNC: 144 MG/DL (CALC)
PLATELET # BLD AUTO: 245 THOUSAND/UL (ref 140–400)
PMV BLD REES-ECKER: 9.6 FL (ref 7.5–12.5)
POTASSIUM SERPL-SCNC: 4.3 MMOL/L (ref 3.5–5.3)
PROT SERPL-MCNC: 6.8 G/DL (ref 6.1–8.1)
RBC # BLD AUTO: 4.85 MILLION/UL (ref 3.8–5.1)
SODIUM SERPL-SCNC: 138 MMOL/L (ref 135–146)
TIBC SERPL-MCNC: 563 MCG/DL (CALC) (ref 250–450)
TRIGL SERPL-MCNC: 175 MG/DL
TSH SERPL-ACNC: 1.64 MIU/L
WBC # BLD AUTO: 5.4 THOUSAND/UL (ref 3.8–10.8)

## 2025-04-24 ENCOUNTER — RESULTS FOLLOW-UP (OUTPATIENT)
Dept: FAMILY MEDICINE CLINIC | Facility: CLINIC | Age: 25
End: 2025-04-24

## 2025-04-24 DIAGNOSIS — E61.1 IRON DEFICIENCY: Primary | ICD-10-CM

## 2025-06-13 DIAGNOSIS — N92.6 IRREGULAR MENSES: ICD-10-CM

## 2025-06-13 DIAGNOSIS — L70.0 ACNE VULGARIS: ICD-10-CM

## 2025-06-13 RX ORDER — DROSPIRENONE AND ETHINYL ESTRADIOL 0.02-3(28)
1 KIT ORAL DAILY
Qty: 84 TABLET | Refills: 0 | Status: SHIPPED | OUTPATIENT
Start: 2025-06-13

## 2025-06-13 NOTE — TELEPHONE ENCOUNTER
Requested medication(s) are due for refill today: Yes  **If antibiotic or given during sick visit, contact patient to discuss current symptoms.   **Confirm prescribing provider    LOV:  4/22/25  **If longer then 1 year, contact patient to schedule annual PRIOR to refilling. Once scheduled, adjust refill for 30 days, no refills.  **Update CareEverywhere to confirm not being seen elsewhere    NOV:  10/24/25    Is patient due for annual visit: No  **If future appointment, adjust to annual/follow up.  ** No appointment call to schedule annual/follow up.    Route to PCP, unless PCP no longer here, then physician they are seeing next.

## 2025-06-18 DIAGNOSIS — N92.6 IRREGULAR MENSES: ICD-10-CM

## 2025-06-18 DIAGNOSIS — L70.0 ACNE VULGARIS: ICD-10-CM

## 2025-06-18 NOTE — TELEPHONE ENCOUNTER
Requested medication(s) are due for refill today: If no, explain: Patient is requesting to be sent to [facility]  **If antibiotic or given during sick visit, contact patient to discuss current symptoms.   **Confirm prescribing provider    LOV:  4/22/25  **If longer then 1 year, contact patient to schedule annual PRIOR to refilling. Once scheduled, adjust refill for 30 days, no refills.  **Update CareEverywhere to confirm not being seen elsewhere    NOV:  10/24/25    Is patient due for annual visit: No  **If future appointment, adjust to annual/follow up.  ** No appointment call to schedule annual/follow up.    Route to PCP, unless PCP no longer here, then physician they are seeing next.

## 2025-06-19 DIAGNOSIS — N92.6 IRREGULAR MENSES: Primary | ICD-10-CM

## 2025-06-19 RX ORDER — DROSPIRENONE AND ETHINYL ESTRADIOL 0.02-3(28)
1 KIT ORAL DAILY
Qty: 84 TABLET | Refills: 0 | OUTPATIENT
Start: 2025-06-19

## 2025-06-19 NOTE — TELEPHONE ENCOUNTER
Reason for call:   [] Refill   [] Prior Auth  [x] Other: mailorder override    Office:   [x] PCP/Provider - Jack  [] Specialty/Provider -     Medication: Eda    Dose/Frequency: 1 tab daily     Quantity: 28    Pharmacy: [id number] - [place], [place] - [address] -[phone]     Local Pharmacy   Does the patient have enough for 3 days?   [] Yes   [x] No - patient is out of medication and Rx was just sent to mail order

## 2025-06-20 RX ORDER — DROSPIRENONE AND ETHINYL ESTRADIOL 0.02-3(28)
1 KIT ORAL DAILY
Qty: 28 TABLET | Refills: 0 | Status: SHIPPED | OUTPATIENT
Start: 2025-06-20

## 2025-06-20 NOTE — TELEPHONE ENCOUNTER
Requested medication(s) are due for refill today: If no, explain[facility]  **If antibiotic or given during sick visit, contact patient to discuss current symptoms.   **Confirm prescribing provider    LOV:  4/22/25  **If longer then 1 year, contact patient to schedule annual PRIOR to refilling. Once scheduled, adjust refill for 30 days, no refills.  **Update CareEverywhere to confirm not being seen elsewhere    NOV:  10/24/25    Is patient due for annual visit: No  **If future appointment, adjust to annual/follow up.  ** No appointment call to schedule annual/follow up.    Route to PCP, unless PCP no longer here, then physician they are seeing next.

## 2025-07-11 DIAGNOSIS — N92.6 IRREGULAR MENSES: ICD-10-CM

## 2025-07-11 RX ORDER — DROSPIRENONE AND ETHINYL ESTRADIOL 0.02-3(28)
1 KIT ORAL DAILY
Qty: 90 TABLET | Refills: 0 | Status: SHIPPED | OUTPATIENT
Start: 2025-07-11

## 2025-07-11 NOTE — TELEPHONE ENCOUNTER
Reason for call:   [x] Refill   [] Prior Auth  [x] Other: she never received her script from [facility] and just wants a 90 day supply sent to [facility]    Office:   [x] PCP/Provider -  AALIYAH Negron   [] Specialty/Provider -     Medication:     drospirenone-ethinyl estradiol (RAQUEL) 3-0.02 MG per tablet       Dose/Frequency:     Take 1 tablet by mouth daily       Quantity: 90    Pharmacy: [id number]  [place], [place] - [address] -[phone]     Local Pharmacy   Does the patient have enough for 3 days?   [] Yes   [x] No - Send as HP to POD    Mail Away Pharmacy   Does the patient have enough for 10 days?   [] Yes   [] No - Send as HP to POD

## 2025-07-21 DIAGNOSIS — F41.9 ANXIETY: ICD-10-CM

## 2025-07-22 RX ORDER — LORAZEPAM 1 MG/1
TABLET ORAL
Qty: 30 TABLET | Refills: 0 | Status: SHIPPED | OUTPATIENT
Start: 2025-07-22

## 2025-07-22 NOTE — TELEPHONE ENCOUNTER
Requested medication(s) are due for refill today: Yes  **If antibiotic or given during sick visit, contact patient to discuss current symptoms.   **Confirm prescribing provider    LOV:  04/22/2025  **If longer then 1 year, contact patient to schedule annual PRIOR to refilling. Once scheduled, adjust refill for 30 days, no refills.  **Update CareEverywhere to confirm not being seen elsewhere    NOV:  10/24/2025    Is patient due for annual visit: No  **If future appointment, adjust to annual/follow up.  ** No appointment call to schedule annual/follow up.    Route to PCP, unless PCP no longer here, then physician they are seeing next.